# Patient Record
Sex: FEMALE | Race: WHITE | NOT HISPANIC OR LATINO | URBAN - METROPOLITAN AREA
[De-identification: names, ages, dates, MRNs, and addresses within clinical notes are randomized per-mention and may not be internally consistent; named-entity substitution may affect disease eponyms.]

---

## 2017-04-13 ENCOUNTER — EMERGENCY (EMERGENCY)
Facility: HOSPITAL | Age: 27
LOS: 1 days | Discharge: HOME | End: 2017-04-13
Attending: EMERGENCY MEDICINE | Admitting: EMERGENCY MEDICINE
Payer: COMMERCIAL

## 2017-04-13 VITALS
HEART RATE: 80 BPM | HEIGHT: 68 IN | OXYGEN SATURATION: 99 % | RESPIRATION RATE: 18 BRPM | DIASTOLIC BLOOD PRESSURE: 74 MMHG | SYSTOLIC BLOOD PRESSURE: 112 MMHG | WEIGHT: 128.09 LBS | TEMPERATURE: 98 F

## 2017-04-13 DIAGNOSIS — Z88.0 ALLERGY STATUS TO PENICILLIN: ICD-10-CM

## 2017-04-13 DIAGNOSIS — R10.9 UNSPECIFIED ABDOMINAL PAIN: ICD-10-CM

## 2017-04-13 DIAGNOSIS — Z88.1 ALLERGY STATUS TO OTHER ANTIBIOTIC AGENTS STATUS: ICD-10-CM

## 2017-04-13 DIAGNOSIS — N20.0 CALCULUS OF KIDNEY: ICD-10-CM

## 2017-04-13 LAB
ALBUMIN SERPL ELPH-MCNC: 4.4 G/DL — SIGNIFICANT CHANGE UP (ref 3.4–5)
ALP SERPL-CCNC: 91 U/L — SIGNIFICANT CHANGE UP (ref 40–120)
ALT FLD-CCNC: 31 U/L — SIGNIFICANT CHANGE UP (ref 12–42)
ANION GAP SERPL CALC-SCNC: 11 MMOL/L — SIGNIFICANT CHANGE UP (ref 9–16)
APPEARANCE UR: CLEAR — SIGNIFICANT CHANGE UP
AST SERPL-CCNC: 34 U/L — SIGNIFICANT CHANGE UP (ref 15–37)
BASOPHILS NFR BLD AUTO: 0.6 % — SIGNIFICANT CHANGE UP (ref 0–2)
BILIRUB SERPL-MCNC: 0.4 MG/DL — SIGNIFICANT CHANGE UP (ref 0.2–1.2)
BILIRUB UR-MCNC: NEGATIVE — SIGNIFICANT CHANGE UP
BUN SERPL-MCNC: 22 MG/DL — SIGNIFICANT CHANGE UP (ref 7–23)
CALCIUM SERPL-MCNC: 10 MG/DL — SIGNIFICANT CHANGE UP (ref 8.5–10.5)
CHLORIDE SERPL-SCNC: 100 MMOL/L — SIGNIFICANT CHANGE UP (ref 96–108)
CO2 SERPL-SCNC: 28 MMOL/L — SIGNIFICANT CHANGE UP (ref 22–31)
COLOR SPEC: YELLOW — SIGNIFICANT CHANGE UP
CREAT SERPL-MCNC: 0.84 MG/DL — SIGNIFICANT CHANGE UP (ref 0.5–1.3)
DIFF PNL FLD: (no result)
EOSINOPHIL NFR BLD AUTO: 3.2 % — SIGNIFICANT CHANGE UP (ref 0–6)
GLUCOSE SERPL-MCNC: 117 MG/DL — HIGH (ref 70–99)
GLUCOSE UR QL: NEGATIVE — SIGNIFICANT CHANGE UP
HCG UR QL: NEGATIVE — SIGNIFICANT CHANGE UP
HCT VFR BLD CALC: 40.5 % — SIGNIFICANT CHANGE UP (ref 34.5–45)
HGB BLD-MCNC: 14 G/DL — SIGNIFICANT CHANGE UP (ref 11.5–15.5)
IMM GRANULOCYTES NFR BLD AUTO: 0.6 % — SIGNIFICANT CHANGE UP (ref 0–1.5)
KETONES UR-MCNC: 15 MG/DL
LEUKOCYTE ESTERASE UR-ACNC: NEGATIVE — SIGNIFICANT CHANGE UP
LYMPHOCYTES # BLD AUTO: 19.9 % — SIGNIFICANT CHANGE UP (ref 13–44)
MCHC RBC-ENTMCNC: 31.3 PG — SIGNIFICANT CHANGE UP (ref 27–34)
MCHC RBC-ENTMCNC: 34.6 G/DL — SIGNIFICANT CHANGE UP (ref 32–36)
MCV RBC AUTO: 90.6 FL — SIGNIFICANT CHANGE UP (ref 80–100)
MONOCYTES NFR BLD AUTO: 8.8 % — SIGNIFICANT CHANGE UP (ref 2–14)
NEUTROPHILS NFR BLD AUTO: 66.9 % — SIGNIFICANT CHANGE UP (ref 43–77)
NITRITE UR-MCNC: NEGATIVE — SIGNIFICANT CHANGE UP
PH UR: 5.5 — SIGNIFICANT CHANGE UP (ref 4–8.1)
PLATELET # BLD AUTO: 258 K/UL — SIGNIFICANT CHANGE UP (ref 150–400)
POTASSIUM SERPL-MCNC: 3.8 MMOL/L — SIGNIFICANT CHANGE UP (ref 3.5–5.3)
POTASSIUM SERPL-SCNC: 3.8 MMOL/L — SIGNIFICANT CHANGE UP (ref 3.5–5.3)
PROT SERPL-MCNC: 8.9 G/DL — HIGH (ref 6.4–8.2)
PROT UR-MCNC: (no result) MG/DL
RBC # BLD: 4.47 M/UL — SIGNIFICANT CHANGE UP (ref 3.8–5.2)
RBC # FLD: 12.8 % — SIGNIFICANT CHANGE UP (ref 10.3–16.9)
SODIUM SERPL-SCNC: 139 MMOL/L — SIGNIFICANT CHANGE UP (ref 132–145)
SP GR SPEC: 1.02 — SIGNIFICANT CHANGE UP (ref 1–1.03)
UROBILINOGEN FLD QL: 0.2 E.U./DL — SIGNIFICANT CHANGE UP
WBC # BLD: 8.9 K/UL — SIGNIFICANT CHANGE UP (ref 3.8–10.5)
WBC # FLD AUTO: 8.9 K/UL — SIGNIFICANT CHANGE UP (ref 3.8–10.5)

## 2017-04-13 PROCEDURE — 99285 EMERGENCY DEPT VISIT HI MDM: CPT

## 2017-04-13 PROCEDURE — 74176 CT ABD & PELVIS W/O CONTRAST: CPT | Mod: 26

## 2017-04-13 RX ORDER — SODIUM CHLORIDE 9 MG/ML
1000 INJECTION INTRAMUSCULAR; INTRAVENOUS; SUBCUTANEOUS ONCE
Qty: 0 | Refills: 0 | Status: COMPLETED | OUTPATIENT
Start: 2017-04-13 | End: 2017-04-13

## 2017-04-13 RX ADMIN — SODIUM CHLORIDE 1000 MILLILITER(S): 9 INJECTION INTRAMUSCULAR; INTRAVENOUS; SUBCUTANEOUS at 22:31

## 2017-04-13 NOTE — ED PROVIDER NOTE - ATTENDING CONTRIBUTION TO CARE
Pt is a 25yo F with a h/o PCOS who p/w 2 days of intermittent R flank pain.  Severe.  Radiates to groin.  +Urinary urgency, dysuria, and hematuria.  Denies any fever, chills, CP, SOB, abd pain, N/V/D, or any other sxs.   PE - agree with PA exam as above.   CT - +3mm kidney stone on R side.   A/P - Kidney stone.  IV, IV hydration, pain control, labs UA.    -Pt improved with treatment.  Results discussed.  Instructed to f/u with urology.  The patient was instructed to return to the ER immediately or call 911 for any high fever, trouble breathing, severe vomiting, worsening pain, or any other concerns.  -Abx given due to dysuria and micro findings.

## 2017-04-13 NOTE — ED PROVIDER NOTE - MEDICAL DECISION MAKING DETAILS
CT reveals + 0.3cm kidney stone in right UVJ. Pt's pain well controlled. A&Ox3. NAD. Afebrile, nontoxic. Sitting comfortably. Will D/C pt with analgesics and F/U with Urology this week. Will cover bacteruria / WBCs with ABX. Strict return precautions reviewed with pt in which pt verbalizes understanding and agrees to.

## 2017-04-13 NOTE — ED ADULT TRIAGE NOTE - CHIEF COMPLAINT QUOTE
[facility]. Pt with c/o b/l intermittent flank pain radiating to pelvis, mild hematuria, dysuria, urinary frequency x2days. Pt had u/a and pelvic exam at [facility] with no findings, see scanned documents.

## 2017-04-14 ENCOUNTER — APPOINTMENT (OUTPATIENT)
Dept: UROLOGY | Facility: CLINIC | Age: 27
End: 2017-04-14

## 2017-04-14 VITALS
HEART RATE: 73 BPM | HEIGHT: 68 IN | TEMPERATURE: 98.7 F | DIASTOLIC BLOOD PRESSURE: 78 MMHG | WEIGHT: 124 LBS | BODY MASS INDEX: 18.79 KG/M2 | SYSTOLIC BLOOD PRESSURE: 123 MMHG

## 2017-04-14 RX ORDER — TRAMADOL HYDROCHLORIDE 50 MG/1
50 TABLET ORAL ONCE
Qty: 0 | Refills: 0 | Status: DISCONTINUED | OUTPATIENT
Start: 2017-04-14 | End: 2017-04-14

## 2017-04-14 RX ORDER — TRAMADOL HYDROCHLORIDE 50 MG/1
1 TABLET ORAL
Qty: 8 | Refills: 0
Start: 2017-04-14

## 2017-04-14 RX ORDER — AZTREONAM 2 G
1 VIAL (EA) INJECTION
Qty: 6 | Refills: 0
Start: 2017-04-14 | End: 2017-04-17

## 2017-04-14 RX ADMIN — TRAMADOL HYDROCHLORIDE 50 MILLIGRAM(S): 50 TABLET ORAL at 01:18

## 2017-04-14 RX ADMIN — Medication 1 TABLET(S): at 01:19

## 2017-04-14 RX ADMIN — TRAMADOL HYDROCHLORIDE 50 MILLIGRAM(S): 50 TABLET ORAL at 01:19

## 2017-04-14 RX ADMIN — TRAMADOL HYDROCHLORIDE 50 MILLIGRAM(S): 50 TABLET ORAL at 01:20

## 2017-04-18 ENCOUNTER — TRANSCRIPTION ENCOUNTER (OUTPATIENT)
Age: 27
End: 2017-04-18

## 2017-04-30 ENCOUNTER — FORM ENCOUNTER (OUTPATIENT)
Age: 27
End: 2017-04-30

## 2017-05-01 ENCOUNTER — OUTPATIENT (OUTPATIENT)
Dept: OUTPATIENT SERVICES | Facility: HOSPITAL | Age: 27
LOS: 1 days | End: 2017-05-01
Payer: COMMERCIAL

## 2017-05-01 PROCEDURE — 76770 US EXAM ABDO BACK WALL COMP: CPT

## 2017-05-01 PROCEDURE — 76770 US EXAM ABDO BACK WALL COMP: CPT | Mod: 26

## 2017-12-29 ENCOUNTER — APPOINTMENT (OUTPATIENT)
Dept: INTERNAL MEDICINE | Facility: CLINIC | Age: 27
End: 2017-12-29
Payer: COMMERCIAL

## 2017-12-29 VITALS
BODY MASS INDEX: 18.79 KG/M2 | HEIGHT: 68 IN | DIASTOLIC BLOOD PRESSURE: 79 MMHG | OXYGEN SATURATION: 100 % | SYSTOLIC BLOOD PRESSURE: 118 MMHG | HEART RATE: 69 BPM | WEIGHT: 124 LBS | TEMPERATURE: 97 F

## 2017-12-29 DIAGNOSIS — Z80.6 FAMILY HISTORY OF LEUKEMIA: ICD-10-CM

## 2017-12-29 PROCEDURE — 36415 COLL VENOUS BLD VENIPUNCTURE: CPT

## 2017-12-29 PROCEDURE — 99395 PREV VISIT EST AGE 18-39: CPT | Mod: 25

## 2018-01-02 LAB
ALBUMIN SERPL ELPH-MCNC: 4.7 G/DL
ALP BLD-CCNC: 71 U/L
ALT SERPL-CCNC: 17 U/L
ANION GAP SERPL CALC-SCNC: 16 MMOL/L
AST SERPL-CCNC: 37 U/L
BASOPHILS # BLD AUTO: 0.03 K/UL
BASOPHILS NFR BLD AUTO: 0.6 %
BILIRUB SERPL-MCNC: 0.5 MG/DL
BUN SERPL-MCNC: 19 MG/DL
CALCIUM SERPL-MCNC: 9.9 MG/DL
CHLORIDE SERPL-SCNC: 102 MMOL/L
CHOLEST SERPL-MCNC: 210 MG/DL
CHOLEST/HDLC SERPL: 2.2 RATIO
CO2 SERPL-SCNC: 23 MMOL/L
CREAT SERPL-MCNC: 1.03 MG/DL
EOSINOPHIL # BLD AUTO: 0.08 K/UL
EOSINOPHIL NFR BLD AUTO: 1.5 %
GLUCOSE SERPL-MCNC: 80 MG/DL
HCT VFR BLD CALC: 40.8 %
HCV AB SER QL: NONREACTIVE
HCV S/CO RATIO: 0.16 S/CO
HDLC SERPL-MCNC: 94 MG/DL
HGB BLD-MCNC: 13.6 G/DL
HIV1+2 AB SPEC QL IA.RAPID: NONREACTIVE
IMM GRANULOCYTES NFR BLD AUTO: 0 %
LDLC SERPL CALC-MCNC: 104 MG/DL
LYMPHOCYTES # BLD AUTO: 1.74 K/UL
LYMPHOCYTES NFR BLD AUTO: 33.5 %
MAN DIFF?: NORMAL
MCHC RBC-ENTMCNC: 30.6 PG
MCHC RBC-ENTMCNC: 33.3 GM/DL
MCV RBC AUTO: 91.7 FL
MONOCYTES # BLD AUTO: 0.4 K/UL
MONOCYTES NFR BLD AUTO: 7.7 %
NEUTROPHILS # BLD AUTO: 2.95 K/UL
NEUTROPHILS NFR BLD AUTO: 56.7 %
PLATELET # BLD AUTO: 294 K/UL
POTASSIUM SERPL-SCNC: 4.2 MMOL/L
PROT SERPL-MCNC: 8.4 G/DL
RBC # BLD: 4.45 M/UL
RBC # FLD: 13.1 %
SODIUM SERPL-SCNC: 141 MMOL/L
TRIGL SERPL-MCNC: 60 MG/DL
TSH SERPL-ACNC: 3.16 UIU/ML
WBC # FLD AUTO: 5.2 K/UL

## 2018-03-02 ENCOUNTER — APPOINTMENT (OUTPATIENT)
Dept: INTERNAL MEDICINE | Facility: CLINIC | Age: 28
End: 2018-03-02

## 2018-10-31 ENCOUNTER — APPOINTMENT (OUTPATIENT)
Dept: INTERNAL MEDICINE | Facility: CLINIC | Age: 28
End: 2018-10-31
Payer: COMMERCIAL

## 2018-10-31 VITALS
TEMPERATURE: 98.1 F | OXYGEN SATURATION: 99 % | HEART RATE: 67 BPM | HEIGHT: 68 IN | DIASTOLIC BLOOD PRESSURE: 75 MMHG | SYSTOLIC BLOOD PRESSURE: 108 MMHG

## 2018-10-31 PROCEDURE — 99214 OFFICE O/P EST MOD 30 MIN: CPT

## 2018-10-31 NOTE — HISTORY OF PRESENT ILLNESS
[de-identified] : 28 yr old  with history of PCOS diagnosis, here c/o difficulty sustaining sleep. She found a great therapist, AMARJIT, whom she sees every other week x 11 months and has great support. The stress of her work is intense and she finds she's awake most nights in the later part of the night.  In high school and college she was prescribed Klonipin and was keen to try this again as it worked for her. 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  Has had difficult last 2 months, death of friend's brother (suicide) and deaths in family. (maternal GM, paternal great uncle, his wife).  She is enjoying her work, but a lot of stress.  Feels a lot of anxiety.  Would like to see psychologist or psych. I'm "not at ease" I'm happy, but describes lingering unease. Has never taken medication, and has no suicidal ideation.  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  Recently diagnosed with PCOS, and metformin started. Endocrinologist at [facility]. Concerned about some weight gain since starting rx.  She's a runner, ran 2 half marathons and is planning on marathon in [place] in fall.   See gyn in January

## 2018-12-06 ENCOUNTER — RX RENEWAL (OUTPATIENT)
Age: 28
End: 2018-12-06

## 2018-12-06 RX ORDER — TRAZODONE HYDROCHLORIDE 50 MG/1
50 TABLET ORAL
Qty: 30 | Refills: 3 | Status: DISCONTINUED | COMMUNITY
Start: 2018-10-31 | End: 2018-12-06

## 2018-12-18 ENCOUNTER — RX RENEWAL (OUTPATIENT)
Age: 28
End: 2018-12-18

## 2019-01-31 ENCOUNTER — APPOINTMENT (OUTPATIENT)
Dept: INTERNAL MEDICINE | Facility: CLINIC | Age: 29
End: 2019-01-31
Payer: COMMERCIAL

## 2019-01-31 VITALS
HEART RATE: 68 BPM | BODY MASS INDEX: 17.94 KG/M2 | SYSTOLIC BLOOD PRESSURE: 105 MMHG | OXYGEN SATURATION: 100 % | HEIGHT: 68 IN | WEIGHT: 118.38 LBS | TEMPERATURE: 97.5 F | DIASTOLIC BLOOD PRESSURE: 72 MMHG

## 2019-01-31 PROCEDURE — 36415 COLL VENOUS BLD VENIPUNCTURE: CPT

## 2019-01-31 PROCEDURE — 99395 PREV VISIT EST AGE 18-39: CPT | Mod: 25

## 2019-01-31 PROCEDURE — 99214 OFFICE O/P EST MOD 30 MIN: CPT | Mod: 25

## 2019-01-31 RX ORDER — THYROID, PORCINE 30 MG/1
30 TABLET ORAL
Qty: 30 | Refills: 0 | Status: DISCONTINUED | COMMUNITY
Start: 2017-06-14 | End: 2019-01-31

## 2019-01-31 RX ORDER — MEDROXYPROGESTERONE ACETATE 10 MG/1
10 TABLET ORAL
Qty: 10 | Refills: 0 | Status: DISCONTINUED | COMMUNITY
Start: 2017-08-09 | End: 2019-01-31

## 2019-01-31 RX ORDER — SULFAMETHOXAZOLE AND TRIMETHOPRIM 800; 160 MG/1; MG/1
800-160 TABLET ORAL
Qty: 14 | Refills: 0 | Status: DISCONTINUED | COMMUNITY
Start: 2017-07-05 | End: 2019-01-31

## 2019-01-31 RX ORDER — THYROID, PORCINE 15 MG/1
15 TABLET ORAL
Qty: 30 | Refills: 0 | Status: DISCONTINUED | COMMUNITY
Start: 2017-07-03 | End: 2019-01-31

## 2019-01-31 NOTE — ASSESSMENT
[FreeTextEntry1] : Patient provided with education on recommended exercise, proper diet, recommended age appropriate screening tests, vaccines, sexual health, smoking cessation and moderate alcohol intake.  STI screening offered including HIV/ Hep C check; proper use of seat belts, helmets on bicycles, also recommended.\par \par Normal exam in 28 yr old runner, ran 5 mi/ week\par \par f/u q 6mo while on prescribing trazadone

## 2019-01-31 NOTE — HISTORY OF PRESENT ILLNESS
[de-identified] : 28 yr old  with history of amenorrhea that resolved (had been underweight; big runner; now off metformin since summer and all meds except trazadone, and feeling much better;  here for physical. Sleeping much better on trazadone\par \par LAST VISIT:  28 yr old  with history of PCOS diagnosis, here c/o difficulty sustaining sleep. She found a great therapist, AMARJIT, whom she sees every other week x 11 months and has great support. The stress of her work is intense and she finds she's awake most nights in the later part of the night.  In high school and college she was prescribed Klonipin and was keen to try this again as it worked for her. 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  Has had difficult last 2 months, death of friend's brother (suicide) and deaths in family. (maternal GM, paternal great uncle, his wife).  She is enjoying her work, but a lot of stress.  Feels a lot of anxiety.  Would like to see psychologist or psych. I'm "not at ease" I'm happy, but describes lingering unease. Has never taken medication, and has no suicidal ideation.  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  Recently diagnosed with PCOS, and metformin started. Endocrinologist at [facility]. Concerned about some weight gain since starting rx.  She's a runner, ran 2 half marathons and is planning on marathon in [place] in fall.   See gyn in January

## 2019-01-31 NOTE — HEALTH RISK ASSESSMENT
[Very Good] : ~his/her~  mood as very good [] : No [No falls in past year] : Patient reported no falls in the past year [0] : 2) Feeling down, depressed, or hopeless: Not at all (0) [de-identified] : "drink socially on weekends" -  [[id number]] : 0 [Patient reported PAP Smear was normal] : Patient reported PAP Smear was normal [HIV Test offered] : HIV Test offered [Hepatitis C test offered] : Hepatitis C test offered [PapSmearDate] : 4/2018

## 2019-02-08 LAB
25(OH)D3 SERPL-MCNC: 35 NG/ML
ALBUMIN SERPL ELPH-MCNC: 5 G/DL
ALP BLD-CCNC: 55 U/L
ALT SERPL-CCNC: 14 U/L
ANION GAP SERPL CALC-SCNC: 14 MMOL/L
AST SERPL-CCNC: 27 U/L
BASOPHILS # BLD AUTO: 0.01 K/UL
BASOPHILS NFR BLD AUTO: 0.2 %
BILIRUB SERPL-MCNC: 0.3 MG/DL
BUN SERPL-MCNC: 23 MG/DL
CALCIUM SERPL-MCNC: 10 MG/DL
CHLORIDE SERPL-SCNC: 105 MMOL/L
CHOLEST SERPL-MCNC: 170 MG/DL
CHOLEST/HDLC SERPL: 2.2 RATIO
CO2 SERPL-SCNC: 24 MMOL/L
CREAT SERPL-MCNC: 0.82 MG/DL
EOSINOPHIL # BLD AUTO: 0.03 K/UL
EOSINOPHIL NFR BLD AUTO: 0.6 %
GLUCOSE SERPL-MCNC: 73 MG/DL
HCT VFR BLD CALC: 37.8 %
HCV AB SER QL: NONREACTIVE
HCV S/CO RATIO: 0.14 S/CO
HDLC SERPL-MCNC: 79 MG/DL
HGB BLD-MCNC: 12.1 G/DL
HIV1+2 AB SPEC QL IA.RAPID: NONREACTIVE
IMM GRANULOCYTES NFR BLD AUTO: 0.2 %
LDLC SERPL CALC-MCNC: 79 MG/DL
LYMPHOCYTES # BLD AUTO: 1.42 K/UL
LYMPHOCYTES NFR BLD AUTO: 28.7 %
MAN DIFF?: NORMAL
MCHC RBC-ENTMCNC: 30.7 PG
MCHC RBC-ENTMCNC: 32 GM/DL
MCV RBC AUTO: 95.9 FL
MONOCYTES # BLD AUTO: 0.41 K/UL
MONOCYTES NFR BLD AUTO: 8.3 %
NEUTROPHILS # BLD AUTO: 3.06 K/UL
NEUTROPHILS NFR BLD AUTO: 62 %
PLATELET # BLD AUTO: 259 K/UL
POTASSIUM SERPL-SCNC: 4 MMOL/L
PROT SERPL-MCNC: 7.6 G/DL
RBC # BLD: 3.94 M/UL
RBC # FLD: 13.4 %
SODIUM SERPL-SCNC: 143 MMOL/L
TRIGL SERPL-MCNC: 58 MG/DL
TSH SERPL-ACNC: 2.93 UIU/ML
WBC # FLD AUTO: 4.94 K/UL

## 2019-03-22 ENCOUNTER — TRANSCRIPTION ENCOUNTER (OUTPATIENT)
Age: 29
End: 2019-03-22

## 2019-03-22 ENCOUNTER — OTHER (OUTPATIENT)
Age: 29
End: 2019-03-22

## 2019-06-18 ENCOUNTER — EMERGENCY (EMERGENCY)
Facility: HOSPITAL | Age: 29
LOS: 1 days | Discharge: HOME | End: 2019-06-18
Admitting: EMERGENCY MEDICINE
Payer: COMMERCIAL

## 2019-06-18 VITALS
RESPIRATION RATE: 17 BRPM | HEART RATE: 49 BPM | TEMPERATURE: 98 F | SYSTOLIC BLOOD PRESSURE: 106 MMHG | HEIGHT: 68 IN | DIASTOLIC BLOOD PRESSURE: 68 MMHG | OXYGEN SATURATION: 99 % | WEIGHT: 110.23 LBS

## 2019-06-18 PROCEDURE — 99283 EMERGENCY DEPT VISIT LOW MDM: CPT | Mod: 25

## 2019-06-18 PROCEDURE — 99284 EMERGENCY DEPT VISIT MOD MDM: CPT

## 2019-06-18 RX ORDER — LIDOCAINE 4 G/100G
10 CREAM TOPICAL ONCE
Refills: 0 | Status: COMPLETED | OUTPATIENT
Start: 2019-06-18 | End: 2019-06-18

## 2019-06-18 RX ORDER — LIDOCAINE 4 G/100G
5 CREAM TOPICAL
Qty: 100 | Refills: 0
Start: 2019-06-18 | End: 2019-06-22

## 2019-06-18 RX ORDER — ACYCLOVIR 50 MG/G
1 OINTMENT TOPICAL
Qty: 1 | Refills: 0
Start: 2019-06-18 | End: 2019-06-21

## 2019-06-18 RX ORDER — VALACYCLOVIR 500 MG/1
2000 TABLET, FILM COATED ORAL ONCE
Refills: 0 | Status: COMPLETED | OUTPATIENT
Start: 2019-06-18 | End: 2019-06-18

## 2019-06-18 RX ORDER — IBUPROFEN 200 MG
1 TABLET ORAL
Qty: 15 | Refills: 0
Start: 2019-06-18 | End: 2019-06-22

## 2019-06-18 RX ORDER — OXYCODONE AND ACETAMINOPHEN 5; 325 MG/1; MG/1
1 TABLET ORAL ONCE
Refills: 0 | Status: DISCONTINUED | OUTPATIENT
Start: 2019-06-18 | End: 2019-06-18

## 2019-06-18 RX ADMIN — OXYCODONE AND ACETAMINOPHEN 1 TABLET(S): 5; 325 TABLET ORAL at 09:09

## 2019-06-18 RX ADMIN — LIDOCAINE 10 MILLILITER(S): 4 CREAM TOPICAL at 08:43

## 2019-06-18 RX ADMIN — VALACYCLOVIR 2000 MILLIGRAM(S): 500 TABLET, FILM COATED ORAL at 09:09

## 2019-06-18 NOTE — ED PROVIDER NOTE - CLINICAL SUMMARY MEDICAL DECISION MAKING FREE TEXT BOX
30 yo F with pmh of hypothyroidism and insomnia c/o lip pain since last night. Pt gets cold sore frequently so last night when she felt some pain she used her topical zovarex cream before bed. Pt woke up with lip swelling and sores. +vesicular rash over upper and lower lips diffuse throughout lips with swelling of lips. No tongue swelling. Given pain control and dose of valtrex.

## 2019-06-18 NOTE — ED PROVIDER NOTE - OBJECTIVE STATEMENT
28 yo F with pmh of hypothyroidism and insomnia c/o lip pain since last night. Pt gets cold sore frequently so last night when she felt some pain she used her topical zovarex cream before bed. Pt woke up with lip swelling and sores. Pain is much worse with more sores than usual. Denies fever, chills, lip swelling, sob. Pt states her pain is severe and she can barely open her mouth , brush her teeth or eat.

## 2019-06-18 NOTE — ED PROVIDER NOTE - NSFOLLOWUPINSTRUCTIONS_ED_ALL_ED_FT
Cold Sore  Image   A cold sore, also called a fever blister, is a skin infection that is caused by a virus. This infection causes small, fluid-filled sores to form inside of the mouth or on the lips, gums, nose, chin, or cheeks. Cold sores can spread to other parts of the body, such as the eyes or fingers.    Cold sores can be spread or passed from person to person (contagious) until the sores crust over completely. Cold sores can be spread through close contact, such as kissing or sharing a drinking glass.    Follow these instructions at home:  Medicines     Take or apply over-the-counter and prescription medicines only as told by your doctor.  Use a cotton-tip swab to apply creams or gels to your sores.  Sore Care     Do not touch the sores or pick the scabs.  Wash your hands often. Do not touch your eyes without washing your hands first.  Keep the sores clean and dry.  If directed, apply ice to the sores:  Put ice in a plastic bag.  Place a towel between your skin and the bag.  Leave the ice on for 20 minutes, 2–3 times per day.  Lifestyle     Do not kiss, have oral sex, or share personal items until your sores heal.  Eat a soft, bland diet. Avoid eating hot, cold, or salty foods. These can hurt your mouth.  Use a straw if it hurts to drink out of a glass.  Avoid the sun and limit your stress if these things trigger outbreaks. If sun causes cold sores, apply sunscreen on your lips before being out in the sun.  Contact a doctor if:  You have symptoms for more than two weeks.  You have pus coming from the sores.  You have redness that is spreading.  You have pain or irritation in your eye.  You get sores on your genitals.  Your sores do not heal within two weeks.  You get cold sores often.  Get help right away if:  You have a fever and your symptoms suddenly get worse.  You have a headache and confusion.

## 2019-06-22 DIAGNOSIS — B00.1 HERPESVIRAL VESICULAR DERMATITIS: ICD-10-CM

## 2019-06-22 DIAGNOSIS — K13.0 DISEASES OF LIPS: ICD-10-CM

## 2019-08-23 ENCOUNTER — APPOINTMENT (OUTPATIENT)
Dept: VASCULAR SURGERY | Facility: CLINIC | Age: 29
End: 2019-08-23
Payer: COMMERCIAL

## 2019-08-23 VITALS — SYSTOLIC BLOOD PRESSURE: 101 MMHG | OXYGEN SATURATION: 99 % | HEART RATE: 66 BPM | DIASTOLIC BLOOD PRESSURE: 66 MMHG

## 2019-08-23 PROCEDURE — 99243 OFF/OP CNSLTJ NEW/EST LOW 30: CPT

## 2019-08-23 PROCEDURE — 93970 EXTREMITY STUDY: CPT

## 2019-08-23 NOTE — HISTORY OF PRESENT ILLNESS
[FreeTextEntry1] : pt has varicose veins in the legs bilat \par she says that the veins are hurting\par they are getting worse\par \par they burn and itch\par

## 2019-08-23 NOTE — PHYSICAL EXAM
[JVD] : no jugular venous distention  [2+] : left 2+ [Ankle Swelling (On Exam)] : not present [Varicose Veins Of Lower Extremities] : bilaterally [Ankle Swelling On The Left] : moderate [] : not present [No Rash or Lesion] : No rash or lesion [Calm] : calm [de-identified] : pleasant

## 2019-10-04 ENCOUNTER — APPOINTMENT (OUTPATIENT)
Dept: VASCULAR SURGERY | Facility: CLINIC | Age: 29
End: 2019-10-04
Payer: COMMERCIAL

## 2019-10-04 DIAGNOSIS — I83.10 VARICOSE VEINS OF UNSPECIFIED LOWER EXTREMITY WITH INFLAMMATION: ICD-10-CM

## 2019-10-04 PROCEDURE — 99213 OFFICE O/P EST LOW 20 MIN: CPT

## 2019-10-04 NOTE — HISTORY OF PRESENT ILLNESS
[FreeTextEntry1] : 29 year old F presents for f/u due to lower extremity varicose veins. Patient has been having a long standing history of varicosities but over the years her varicose veins became large, more pronounced, tender to touch.She states that her varicose veins burn, itch and interfere with daily activities. Patient has been wearing compression stockings for about a year with minimal relief. Patient states that it became difficult for her to stay for prolonged period of time (> 1/2 hr). Patient occasionally takes over the counter pain medications to alleviate her  symptoms.\par Patient denies hx of DVT/SVT, bleeding from her  varicosities, claudication.\par

## 2019-10-04 NOTE — PHYSICAL EXAM
[Normal Breath Sounds] : Normal breath sounds [Normal Heart Sounds] : normal heart sounds [2+] : left 2+ [Varicose Veins Of Lower Extremities] : bilaterally [Ankle Swelling On The Left] : moderate [No Rash or Lesion] : No rash or lesion [Alert] : alert [Calm] : calm [JVD] : no jugular venous distention  [Ankle Swelling (On Exam)] : not present [] : not present [Abdomen Tenderness] : ~T ~M No abdominal tenderness [de-identified] : WN/WD, NAD [de-identified] : NC/AT [de-identified] : + FROM all 4 extr. [de-identified] : b/l bulging varicose veins

## 2019-10-04 NOTE — REVIEW OF SYSTEMS
[As Noted in HPI] : as noted in HPI [Limb Pain] : limb pain [Negative] : Heme/Lymph [Limb Swelling] : no limb swelling

## 2019-10-04 NOTE — ASSESSMENT
[Arterial/Venous Disease] : arterial/venous disease [FreeTextEntry1] : 29 year old F presents for f/u due to lower extremity varicose veins.\par Patient with RLE GSV reflux that is short and b/l gross varicosities that burn, itch and interfere with patient's exercising.\par Patient is a runner and she states that towards the end of a day she is in "excruciating pain" over her veins and she really would like to take them out.\par She tried 20-30 mmHg compression stockings with temporarily relief, she tried OTC medications, but she would like to have the definitive solution.\par We recommend Stab phlebectomy, one leg at a time.\par She agreed and would like to proceed.

## 2019-10-08 PROBLEM — I83.10 VARICOSE VEINS OF LOWER EXTREMITY WITH INFLAMMATION, UNSPECIFIED LATERALITY: Status: ACTIVE | Noted: 2019-10-08

## 2020-02-12 ENCOUNTER — RX RENEWAL (OUTPATIENT)
Age: 30
End: 2020-02-12

## 2020-10-12 ENCOUNTER — RX RENEWAL (OUTPATIENT)
Age: 30
End: 2020-10-12

## 2020-10-18 PROBLEM — Z11.3 ROUTINE SCREENING FOR STI (SEXUALLY TRANSMITTED INFECTION): Status: ACTIVE | Noted: 2020-10-18

## 2020-10-19 ENCOUNTER — APPOINTMENT (OUTPATIENT)
Dept: INTERNAL MEDICINE | Facility: CLINIC | Age: 30
End: 2020-10-19
Payer: COMMERCIAL

## 2020-10-19 VITALS
WEIGHT: 112 LBS | TEMPERATURE: 97.8 F | BODY MASS INDEX: 16.97 KG/M2 | SYSTOLIC BLOOD PRESSURE: 118 MMHG | HEIGHT: 68 IN | DIASTOLIC BLOOD PRESSURE: 73 MMHG | HEART RATE: 64 BPM | OXYGEN SATURATION: 98 %

## 2020-10-19 DIAGNOSIS — Z11.3 ENCOUNTER FOR SCREENING FOR INFECTIONS WITH A PREDOMINANTLY SEXUAL MODE OF TRANSMISSION: ICD-10-CM

## 2020-10-19 DIAGNOSIS — E55.9 VITAMIN D DEFICIENCY, UNSPECIFIED: ICD-10-CM

## 2020-10-19 DIAGNOSIS — Z12.11 ENCOUNTER FOR SCREENING FOR MALIGNANT NEOPLASM OF COLON: ICD-10-CM

## 2020-10-19 PROCEDURE — G0442 ANNUAL ALCOHOL SCREEN 15 MIN: CPT | Mod: NC

## 2020-10-19 PROCEDURE — 36415 COLL VENOUS BLD VENIPUNCTURE: CPT

## 2020-10-19 PROCEDURE — 99072 ADDL SUPL MATRL&STAF TM PHE: CPT

## 2020-10-19 PROCEDURE — 99395 PREV VISIT EST AGE 18-39: CPT | Mod: 25

## 2020-10-19 RX ORDER — LEVOTHYROXINE SODIUM 50 UG/1
50 TABLET ORAL
Qty: 36 | Refills: 0 | Status: ACTIVE | COMMUNITY
Start: 2020-02-06

## 2020-10-19 RX ORDER — LINACLOTIDE 72 UG/1
72 CAPSULE, GELATIN COATED ORAL
Qty: 30 | Refills: 0 | Status: ACTIVE | COMMUNITY
Start: 2020-08-28

## 2020-10-20 LAB
25(OH)D3 SERPL-MCNC: 37.5 NG/ML
ALBUMIN SERPL ELPH-MCNC: 4.8 G/DL
ALP BLD-CCNC: 80 U/L
ALT SERPL-CCNC: 17 U/L
ANION GAP SERPL CALC-SCNC: 17 MMOL/L
AST SERPL-CCNC: 31 U/L
BASOPHILS # BLD AUTO: 0.05 K/UL
BASOPHILS NFR BLD AUTO: 0.6 %
BILIRUB SERPL-MCNC: 0.4 MG/DL
BUN SERPL-MCNC: 17 MG/DL
CALCIUM SERPL-MCNC: 9.4 MG/DL
CHLORIDE SERPL-SCNC: 102 MMOL/L
CHOLEST SERPL-MCNC: 182 MG/DL
CHOLEST/HDLC SERPL: 1.9 RATIO
CO2 SERPL-SCNC: 19 MMOL/L
CREAT SERPL-MCNC: 0.73 MG/DL
EOSINOPHIL # BLD AUTO: 0.04 K/UL
EOSINOPHIL NFR BLD AUTO: 0.5 %
GLUCOSE SERPL-MCNC: 80 MG/DL
HCT VFR BLD CALC: 37.8 %
HCV AB SER QL: NONREACTIVE
HCV S/CO RATIO: 0.15 S/CO
HDLC SERPL-MCNC: 94 MG/DL
HGB BLD-MCNC: 12.1 G/DL
HIV1+2 AB SPEC QL IA.RAPID: NONREACTIVE
IMM GRANULOCYTES NFR BLD AUTO: 0.2 %
LDLC SERPL CALC-MCNC: 74 MG/DL
LYMPHOCYTES # BLD AUTO: 2.29 K/UL
LYMPHOCYTES NFR BLD AUTO: 28.1 %
MAN DIFF?: NORMAL
MCHC RBC-ENTMCNC: 31.3 PG
MCHC RBC-ENTMCNC: 32 GM/DL
MCV RBC AUTO: 97.9 FL
MONOCYTES # BLD AUTO: 0.48 K/UL
MONOCYTES NFR BLD AUTO: 5.9 %
NEUTROPHILS # BLD AUTO: 5.27 K/UL
NEUTROPHILS NFR BLD AUTO: 64.7 %
PLATELET # BLD AUTO: 266 K/UL
POTASSIUM SERPL-SCNC: 3.9 MMOL/L
PROT SERPL-MCNC: 7.4 G/DL
RBC # BLD: 3.86 M/UL
RBC # FLD: 12.6 %
SODIUM SERPL-SCNC: 139 MMOL/L
TRIGL SERPL-MCNC: 74 MG/DL
TSH SERPL-ACNC: 1.1 UIU/ML
WBC # FLD AUTO: 8.15 K/UL

## 2020-11-20 NOTE — ED PROVIDER NOTE - PHYSICAL EXAMINATION
Addended by: PATRICK PARIS on: 11/20/2020 01:44 PM     Modules accepted: Orders    
CONSTITUTIONAL: Well-appearing; well-nourished; in no apparent distress.   HEAD: Normocephalic; atraumatic.   EYES: PERRL; EOM intact; conjunctiva and sclera clear  ENT: +vesicular rash over upper and lower lips diffuse throughout lips with swelling of lips. No tongue swelling.   NECK: Supple; non-tender;   CARDIOVASCULAR: Normal S1, S2; no murmurs, rubs, or gallops. Regular rate and rhythm.   RESPIRATORY: Breathing easily; breath sounds clear and equal bilaterally; no wheezes, rhonchi, or rales.  MSK: FROM at all extremities, normal tone   EXT: No cyanosis or edema; N/V intact  SKIN: Normal for age and race; warm; dry; good turgor; no apparent lesions or rash.

## 2021-02-15 ENCOUNTER — EMERGENCY (EMERGENCY)
Facility: HOSPITAL | Age: 31
LOS: 1 days | Discharge: HOME | End: 2021-02-15
Attending: EMERGENCY MEDICINE | Admitting: EMERGENCY MEDICINE
Payer: COMMERCIAL

## 2021-02-15 VITALS
OXYGEN SATURATION: 100 % | RESPIRATION RATE: 18 BRPM | TEMPERATURE: 98 F | HEART RATE: 52 BPM | HEIGHT: 68 IN | WEIGHT: 117.95 LBS | SYSTOLIC BLOOD PRESSURE: 133 MMHG | DIASTOLIC BLOOD PRESSURE: 75 MMHG

## 2021-02-15 VITALS
TEMPERATURE: 98 F | DIASTOLIC BLOOD PRESSURE: 67 MMHG | OXYGEN SATURATION: 99 % | SYSTOLIC BLOOD PRESSURE: 124 MMHG | RESPIRATION RATE: 16 BRPM | HEART RATE: 55 BPM

## 2021-02-15 DIAGNOSIS — N20.0 CALCULUS OF KIDNEY: ICD-10-CM

## 2021-02-15 DIAGNOSIS — R11.2 NAUSEA WITH VOMITING, UNSPECIFIED: ICD-10-CM

## 2021-02-15 DIAGNOSIS — Z20.822 CONTACT WITH AND (SUSPECTED) EXPOSURE TO COVID-19: ICD-10-CM

## 2021-02-15 PROCEDURE — 74176 CT ABD & PELVIS W/O CONTRAST: CPT | Mod: 26,MG

## 2021-02-15 PROCEDURE — G1004: CPT

## 2021-02-15 PROCEDURE — 99285 EMERGENCY DEPT VISIT HI MDM: CPT

## 2021-02-15 RX ORDER — ACETAMINOPHEN 500 MG
650 TABLET ORAL ONCE
Refills: 0 | Status: COMPLETED | OUTPATIENT
Start: 2021-02-15 | End: 2021-02-15

## 2021-02-15 RX ORDER — MORPHINE SULFATE 50 MG/1
4 CAPSULE, EXTENDED RELEASE ORAL ONCE
Refills: 0 | Status: DISCONTINUED | OUTPATIENT
Start: 2021-02-15 | End: 2021-02-15

## 2021-02-15 RX ORDER — OXYCODONE HYDROCHLORIDE 5 MG/1
1 TABLET ORAL
Qty: 16 | Refills: 0
Start: 2021-02-15 | End: 2021-02-18

## 2021-02-15 RX ORDER — KETOROLAC TROMETHAMINE 30 MG/ML
15 SYRINGE (ML) INJECTION ONCE
Refills: 0 | Status: DISCONTINUED | OUTPATIENT
Start: 2021-02-15 | End: 2021-02-15

## 2021-02-15 RX ORDER — SODIUM CHLORIDE 9 MG/ML
1000 INJECTION INTRAMUSCULAR; INTRAVENOUS; SUBCUTANEOUS ONCE
Refills: 0 | Status: COMPLETED | OUTPATIENT
Start: 2021-02-15 | End: 2021-02-15

## 2021-02-15 RX ADMIN — Medication 15 MILLIGRAM(S): at 08:56

## 2021-02-15 RX ADMIN — MORPHINE SULFATE 4 MILLIGRAM(S): 50 CAPSULE, EXTENDED RELEASE ORAL at 10:02

## 2021-02-15 RX ADMIN — Medication 650 MILLIGRAM(S): at 08:57

## 2021-02-15 RX ADMIN — SODIUM CHLORIDE 1000 MILLILITER(S): 9 INJECTION INTRAMUSCULAR; INTRAVENOUS; SUBCUTANEOUS at 08:56

## 2021-02-15 NOTE — ED PROVIDER NOTE - NSFOLLOWUPINSTRUCTIONS_ED_ALL_ED_FT
Can take tylenol 650mg or motrin 600mg (May cause stomach irritation - take with food and avoid prolonged use) every 6hrs as needed for pain.  Can take oxycodone as prescribed for more severe pain - may cause constipation! Stay well hydrated and can take over the counter stool softener like senna or colace.   Stay well hydrated.  Return for fevers, persistent vomit, uncontrolled pain, worsening breathing, worsening lightheaded, unable to urinate.  Follow up with primary doctor within 1-2 days.   Follow up with urologist within 1 week.   Can call [phone] to schedule appointment.     Kidney Stones    Kidney stones (urolithiasis) are crystal deposits that form inside your kidneys. Pain is caused by the stone moving through the urinary tract, causing spasms of the ureter. Drink enough water and fluids to keep your urine clear or pale yellow. This will help you to pass the stone or stone fragments. If provided a strainer, strain all urine and keep all particulate matter and stones for a follow up appointment with a urologist.    SEEK IMMEDIATE MEDICAL CARE IF YOU HAVE ANY OF THE FOLLOWING SYMPTOMS: pain not controlled with medication, fever/chills, worsening vomiting, inability to urinate, or dizziness/lightheadedness. Can take tylenol 650mg or motrin 600mg (May cause stomach irritation - take with food and avoid prolonged use) every 6hrs as needed for pain.  Can take oxycodone as prescribed for more severe pain - may cause constipation! Stay well hydrated and can take over the counter stool softener like senna or colace.   Stay well hydrated.  Return for fevers, persistent vomit, uncontrolled pain, worsening breathing, worsening lightheaded, unable to urinate.  Follow up with primary doctor within 1-2 days.   Follow up with urologist within 1 week.   Can call [phone] to schedule appointment.     Your COVID results are pending, can call ER at [phone] to get results. Can take a few hours to a few days to result. If you are "positive," you will get a phone call.     Kidney Stones    Kidney stones (urolithiasis) are crystal deposits that form inside your kidneys. Pain is caused by the stone moving through the urinary tract, causing spasms of the ureter. Drink enough water and fluids to keep your urine clear or pale yellow. This will help you to pass the stone or stone fragments. If provided a strainer, strain all urine and keep all particulate matter and stones for a follow up appointment with a urologist.    SEEK IMMEDIATE MEDICAL CARE IF YOU HAVE ANY OF THE FOLLOWING SYMPTOMS: pain not controlled with medication, fever/chills, worsening vomiting, inability to urinate, or dizziness/lightheadedness.

## 2021-02-15 NOTE — ED PROVIDER NOTE - PATIENT PORTAL LINK FT
You can access the FollowMyHealth Patient Portal offered by [facility] by registering at the following website: [email or web address]. By joining [employer]’s FollowMyHealth portal, you will also be able to view your health information using other applications (apps) compatible with our system.

## 2021-02-15 NOTE — ED PROVIDER NOTE - CPE EDP SKIN NORM
Nursing Note by Bridget Gary MA at 01/19/18 10:09 AM     Author:  Bridget Gary MA Service:  (none) Author Type:  Medical Assistant     Filed:  01/19/18 10:09 AM Encounter Date:  1/19/2018 Status:  Signed     :  Bridget Gary MA (Medical Assistant)            If provider orders tests at today's visit, patient would like to be contacted via[AG1.1T] my chart[AG1.1M] ([employer] or by telephone).  If to contact patient by phone, patient's preferred phone # is [phone] (cell) and it is[AG1.1T] ok[AG1.1M] to leave message on voice mail or with family member.  If medications are ordered at today's visit, the pharmacy name/location patient would like them to be sent to is      [facility] [address] [place] [address] & [place]  .[AG1.1T]         Revision History        User Key Date/Time User Provider Type Action    > AG1.1 01/19/18 10:09 AM Bridget Gary MA Medical Assistant Sign    M - Manual, T - Template             normal...

## 2021-02-15 NOTE — ED PROVIDER NOTE - OBJECTIVE STATEMENT
30F PMH hypothyroid, nephrolithiasis (2017, conservative management) p/w R flank pain, since ~1400 yesterday, sudden onset, fluctuating but constant, feels similar to prior stone. Period began yesterday, also has her usual "menstrual cramps," last took aleve last night w/ improvement in her cramps but not the flank pain. NBNB NV x1, no further nausea. Also diarrhea x5.   Denies HA, SOB/CP, f/c, URI symptoms, dysuria/hematuria, focal weakness/numbness, rashes, recent travel/antibiotics, black/bloody stool.   meds: synthroid, trazadone qhs for sleep

## 2021-02-15 NOTE — ED PROVIDER NOTE - PHYSICAL EXAMINATION
no LE edema, normal equal distal pulses, steady unassisted gait. no CVAT  No spinal ttp, neck FROM. Strength 5/5. No bony ttp, FROM all extremities.

## 2021-02-15 NOTE — ED PROVIDER NOTE - CLINICAL SUMMARY MEDICAL DECISION MAKING FREE TEXT BOX
30F PMH hypothyroid, nephrolithiasis (2017, conservative management) p/w R flank pain, since ~1400 yesterday, sudden onset, fluctuating but constant, feels similar to prior stone. Period began yesterday, also has her usual "menstrual cramps," last took aleve last night w/ improvement in her cramps but not the flank pain. NBNB NV x1, no further nausea. Also diarrhea x5. No other systemic symptoms. Bradycardia, other vitals wnl, exam as above. Very well appearing.  ddx: Likely renal colic vs. MSK vs. less likely pyelo.  Labs, CT, UA, IVF/symptom control, reassess.

## 2021-02-15 NOTE — ED PROVIDER NOTE - PROGRESS NOTE DETAILS
Klepfish: WBC 11, other labs grossly wnl. UA no infection. CT showing "Mild right hydronephrosis and perinephric/periureteral fat stranding, with 2 proximal ureteral stones measuring 2 mm and 3 mm respectively" Pt received morphine ~10min ago, pain now improved. Will reassess. Klepfish: pt remains feeling much better. tolerating po. Clinically no indication for further emergent ED workup or hospitalization at this time. Comfortable for dc, outpt f/u.

## 2021-02-15 NOTE — ED ADULT TRIAGE NOTE - CHIEF COMPLAINT QUOTE
Pt states " I think I have a kidney stone." + R sided flank pain since 2:30 am. + Nausea and diarrhea. currently on menstrual cycle. denies fevers, chills.

## 2021-02-15 NOTE — ED PROVIDER NOTE - CARE PROVIDER_API CALL
Arianna Polanco)  Urology  [address]  [place], [place] [address]  Phone: [phone]  Fax: [phone]  Follow Up Time:

## 2021-02-16 ENCOUNTER — APPOINTMENT (OUTPATIENT)
Dept: INTERNAL MEDICINE | Facility: CLINIC | Age: 31
End: 2021-02-16

## 2021-02-16 ENCOUNTER — TRANSCRIPTION ENCOUNTER (OUTPATIENT)
Age: 31
End: 2021-02-16

## 2021-02-16 ENCOUNTER — INPATIENT (INPATIENT)
Facility: HOSPITAL | Age: 31
LOS: 1 days | Discharge: HOME | DRG: 660 | End: 2021-02-18
Attending: UROLOGY | Admitting: UROLOGY
Payer: COMMERCIAL

## 2021-02-16 VITALS
DIASTOLIC BLOOD PRESSURE: 85 MMHG | RESPIRATION RATE: 16 BRPM | SYSTOLIC BLOOD PRESSURE: 126 MMHG | HEART RATE: 57 BPM | OXYGEN SATURATION: 97 % | WEIGHT: 117.95 LBS | HEIGHT: 68 IN | TEMPERATURE: 99 F

## 2021-02-16 DIAGNOSIS — N13.2 HYDRONEPHROSIS WITH RENAL AND URETERAL CALCULOUS OBSTRUCTION: ICD-10-CM

## 2021-02-16 DIAGNOSIS — E03.9 HYPOTHYROIDISM, UNSPECIFIED: ICD-10-CM

## 2021-02-16 LAB
ALBUMIN SERPL ELPH-MCNC: 4.1 G/DL — SIGNIFICANT CHANGE UP (ref 3.3–5)
ALP SERPL-CCNC: 69 U/L — SIGNIFICANT CHANGE UP (ref 40–120)
ALT FLD-CCNC: 12 U/L — SIGNIFICANT CHANGE UP (ref 10–45)
ANION GAP SERPL CALC-SCNC: 9 MMOL/L — SIGNIFICANT CHANGE UP (ref 5–17)
APPEARANCE UR: CLEAR — SIGNIFICANT CHANGE UP
APTT BLD: 25.3 SEC — LOW (ref 27.5–35.5)
AST SERPL-CCNC: 26 U/L — SIGNIFICANT CHANGE UP (ref 10–40)
BASOPHILS # BLD AUTO: 0.02 K/UL — SIGNIFICANT CHANGE UP (ref 0–0.2)
BASOPHILS NFR BLD AUTO: 0.2 % — SIGNIFICANT CHANGE UP (ref 0–2)
BILIRUB SERPL-MCNC: 0.4 MG/DL — SIGNIFICANT CHANGE UP (ref 0.2–1.2)
BILIRUB UR-MCNC: NEGATIVE — SIGNIFICANT CHANGE UP
BLD GP AB SCN SERPL QL: NEGATIVE — SIGNIFICANT CHANGE UP
BUN SERPL-MCNC: 17 MG/DL — SIGNIFICANT CHANGE UP (ref 7–23)
CALCIUM SERPL-MCNC: 8.9 MG/DL — SIGNIFICANT CHANGE UP (ref 8.4–10.5)
CHLORIDE SERPL-SCNC: 101 MMOL/L — SIGNIFICANT CHANGE UP (ref 96–108)
CO2 SERPL-SCNC: 25 MMOL/L — SIGNIFICANT CHANGE UP (ref 22–31)
COLOR SPEC: YELLOW — SIGNIFICANT CHANGE UP
CREAT SERPL-MCNC: 1.11 MG/DL — SIGNIFICANT CHANGE UP (ref 0.5–1.3)
DIFF PNL FLD: NEGATIVE — SIGNIFICANT CHANGE UP
EOSINOPHIL # BLD AUTO: 0.04 K/UL — SIGNIFICANT CHANGE UP (ref 0–0.5)
EOSINOPHIL NFR BLD AUTO: 0.3 % — SIGNIFICANT CHANGE UP (ref 0–6)
GLUCOSE SERPL-MCNC: 93 MG/DL — SIGNIFICANT CHANGE UP (ref 70–99)
GLUCOSE UR QL: NEGATIVE — SIGNIFICANT CHANGE UP
HCT VFR BLD CALC: 35.6 % — SIGNIFICANT CHANGE UP (ref 34.5–45)
HGB BLD-MCNC: 11.5 G/DL — SIGNIFICANT CHANGE UP (ref 11.5–15.5)
IMM GRANULOCYTES NFR BLD AUTO: 0.4 % — SIGNIFICANT CHANGE UP (ref 0–1.5)
INR BLD: 0.99 — SIGNIFICANT CHANGE UP (ref 0.88–1.16)
KETONES UR-MCNC: NEGATIVE — SIGNIFICANT CHANGE UP
LEUKOCYTE ESTERASE UR-ACNC: NEGATIVE — SIGNIFICANT CHANGE UP
LYMPHOCYTES # BLD AUTO: 1.11 K/UL — SIGNIFICANT CHANGE UP (ref 1–3.3)
LYMPHOCYTES # BLD AUTO: 9.2 % — LOW (ref 13–44)
MCHC RBC-ENTMCNC: 30.3 PG — SIGNIFICANT CHANGE UP (ref 27–34)
MCHC RBC-ENTMCNC: 32.3 GM/DL — SIGNIFICANT CHANGE UP (ref 32–36)
MCV RBC AUTO: 93.7 FL — SIGNIFICANT CHANGE UP (ref 80–100)
MONOCYTES # BLD AUTO: 1.12 K/UL — HIGH (ref 0–0.9)
MONOCYTES NFR BLD AUTO: 9.3 % — SIGNIFICANT CHANGE UP (ref 2–14)
NEUTROPHILS # BLD AUTO: 9.67 K/UL — HIGH (ref 1.8–7.4)
NEUTROPHILS NFR BLD AUTO: 80.6 % — HIGH (ref 43–77)
NITRITE UR-MCNC: NEGATIVE — SIGNIFICANT CHANGE UP
NRBC # BLD: 0 /100 WBCS — SIGNIFICANT CHANGE UP (ref 0–0)
PH UR: 5.5 — SIGNIFICANT CHANGE UP (ref 5–8)
PLATELET # BLD AUTO: 229 K/UL — SIGNIFICANT CHANGE UP (ref 150–400)
POTASSIUM SERPL-MCNC: 3.7 MMOL/L — SIGNIFICANT CHANGE UP (ref 3.5–5.3)
POTASSIUM SERPL-SCNC: 3.7 MMOL/L — SIGNIFICANT CHANGE UP (ref 3.5–5.3)
PROT SERPL-MCNC: 7.3 G/DL — SIGNIFICANT CHANGE UP (ref 6–8.3)
PROT UR-MCNC: NEGATIVE MG/DL — SIGNIFICANT CHANGE UP
PROTHROM AB SERPL-ACNC: 11.9 SEC — SIGNIFICANT CHANGE UP (ref 10.6–13.6)
RBC # BLD: 3.8 M/UL — SIGNIFICANT CHANGE UP (ref 3.8–5.2)
RBC # FLD: 12.3 % — SIGNIFICANT CHANGE UP (ref 10.3–14.5)
RH IG SCN BLD-IMP: POSITIVE — SIGNIFICANT CHANGE UP
SARS-COV-2 RNA SPEC QL NAA+PROBE: SIGNIFICANT CHANGE UP
SODIUM SERPL-SCNC: 135 MMOL/L — SIGNIFICANT CHANGE UP (ref 135–145)
SP GR SPEC: 1.02 — SIGNIFICANT CHANGE UP (ref 1–1.03)
UROBILINOGEN FLD QL: 0.2 E.U./DL — SIGNIFICANT CHANGE UP
WBC # BLD: 12.01 K/UL — HIGH (ref 3.8–10.5)
WBC # FLD AUTO: 12.01 K/UL — HIGH (ref 3.8–10.5)

## 2021-02-16 PROCEDURE — 99223 1ST HOSP IP/OBS HIGH 75: CPT | Mod: 57,25

## 2021-02-16 PROCEDURE — 99285 EMERGENCY DEPT VISIT HI MDM: CPT

## 2021-02-16 RX ORDER — HYDROMORPHONE HYDROCHLORIDE 2 MG/ML
0.5 INJECTION INTRAMUSCULAR; INTRAVENOUS; SUBCUTANEOUS EVERY 4 HOURS
Refills: 0 | Status: DISCONTINUED | OUTPATIENT
Start: 2021-02-16 | End: 2021-02-16

## 2021-02-16 RX ORDER — KETOROLAC TROMETHAMINE 30 MG/ML
15 SYRINGE (ML) INJECTION ONCE
Refills: 0 | Status: DISCONTINUED | OUTPATIENT
Start: 2021-02-16 | End: 2021-02-16

## 2021-02-16 RX ORDER — SODIUM CHLORIDE 9 MG/ML
1000 INJECTION INTRAMUSCULAR; INTRAVENOUS; SUBCUTANEOUS
Refills: 0 | Status: DISCONTINUED | OUTPATIENT
Start: 2021-02-16 | End: 2021-02-17

## 2021-02-16 RX ORDER — TRAZODONE HCL 50 MG
50 TABLET ORAL AT BEDTIME
Refills: 0 | Status: DISCONTINUED | OUTPATIENT
Start: 2021-02-16 | End: 2021-02-18

## 2021-02-16 RX ORDER — TAMSULOSIN HYDROCHLORIDE 0.4 MG/1
0.4 CAPSULE ORAL ONCE
Refills: 0 | Status: COMPLETED | OUTPATIENT
Start: 2021-02-16 | End: 2021-02-16

## 2021-02-16 RX ORDER — MORPHINE SULFATE 50 MG/1
4 CAPSULE, EXTENDED RELEASE ORAL EVERY 4 HOURS
Refills: 0 | Status: DISCONTINUED | OUTPATIENT
Start: 2021-02-16 | End: 2021-02-16

## 2021-02-16 RX ORDER — LEVOTHYROXINE SODIUM 125 MCG
50 TABLET ORAL DAILY
Refills: 0 | Status: DISCONTINUED | OUTPATIENT
Start: 2021-02-16 | End: 2021-02-18

## 2021-02-16 RX ORDER — ONDANSETRON 8 MG/1
4 TABLET, FILM COATED ORAL ONCE
Refills: 0 | Status: COMPLETED | OUTPATIENT
Start: 2021-02-16 | End: 2021-02-16

## 2021-02-16 RX ORDER — LEVOTHYROXINE SODIUM 125 MCG
1 TABLET ORAL
Qty: 0 | Refills: 0 | DISCHARGE

## 2021-02-16 RX ORDER — ONDANSETRON 8 MG/1
4 TABLET, FILM COATED ORAL EVERY 8 HOURS
Refills: 0 | Status: DISCONTINUED | OUTPATIENT
Start: 2021-02-16 | End: 2021-02-18

## 2021-02-16 RX ORDER — OXYCODONE HYDROCHLORIDE 5 MG/1
5 TABLET ORAL EVERY 4 HOURS
Refills: 0 | Status: DISCONTINUED | OUTPATIENT
Start: 2021-02-16 | End: 2021-02-18

## 2021-02-16 RX ORDER — HYDROMORPHONE HYDROCHLORIDE 2 MG/ML
0.5 INJECTION INTRAMUSCULAR; INTRAVENOUS; SUBCUTANEOUS EVERY 4 HOURS
Refills: 0 | Status: DISCONTINUED | OUTPATIENT
Start: 2021-02-16 | End: 2021-02-18

## 2021-02-16 RX ORDER — ACETAMINOPHEN 500 MG
650 TABLET ORAL EVERY 6 HOURS
Refills: 0 | Status: DISCONTINUED | OUTPATIENT
Start: 2021-02-16 | End: 2021-02-18

## 2021-02-16 RX ORDER — SODIUM CHLORIDE 9 MG/ML
2000 INJECTION INTRAMUSCULAR; INTRAVENOUS; SUBCUTANEOUS ONCE
Refills: 0 | Status: COMPLETED | OUTPATIENT
Start: 2021-02-16 | End: 2021-02-16

## 2021-02-16 RX ORDER — TRAZODONE HCL 50 MG
50 TABLET ORAL
Qty: 0 | Refills: 0 | DISCHARGE

## 2021-02-16 RX ORDER — MORPHINE SULFATE 50 MG/1
4 CAPSULE, EXTENDED RELEASE ORAL ONCE
Refills: 0 | Status: DISCONTINUED | OUTPATIENT
Start: 2021-02-16 | End: 2021-02-16

## 2021-02-16 RX ADMIN — HYDROMORPHONE HYDROCHLORIDE 0.5 MILLIGRAM(S): 2 INJECTION INTRAMUSCULAR; INTRAVENOUS; SUBCUTANEOUS at 23:45

## 2021-02-16 RX ADMIN — OXYCODONE HYDROCHLORIDE 5 MILLIGRAM(S): 5 TABLET ORAL at 19:04

## 2021-02-16 RX ADMIN — SODIUM CHLORIDE 2000 MILLILITER(S): 9 INJECTION INTRAMUSCULAR; INTRAVENOUS; SUBCUTANEOUS at 08:38

## 2021-02-16 RX ADMIN — SODIUM CHLORIDE 150 MILLILITER(S): 9 INJECTION INTRAMUSCULAR; INTRAVENOUS; SUBCUTANEOUS at 11:15

## 2021-02-16 RX ADMIN — MORPHINE SULFATE 4 MILLIGRAM(S): 50 CAPSULE, EXTENDED RELEASE ORAL at 08:38

## 2021-02-16 RX ADMIN — ONDANSETRON 4 MILLIGRAM(S): 8 TABLET, FILM COATED ORAL at 08:38

## 2021-02-16 RX ADMIN — HYDROMORPHONE HYDROCHLORIDE 0.5 MILLIGRAM(S): 2 INJECTION INTRAMUSCULAR; INTRAVENOUS; SUBCUTANEOUS at 13:30

## 2021-02-16 RX ADMIN — MORPHINE SULFATE 4 MILLIGRAM(S): 50 CAPSULE, EXTENDED RELEASE ORAL at 16:20

## 2021-02-16 RX ADMIN — Medication 15 MILLIGRAM(S): at 08:38

## 2021-02-16 RX ADMIN — TAMSULOSIN HYDROCHLORIDE 0.4 MILLIGRAM(S): 0.4 CAPSULE ORAL at 11:15

## 2021-02-16 NOTE — ED ADULT TRIAGE NOTE - SPO2 (%)
ID Progress Note                [address] [facility] [address], [place], [address]            Phone [phone]     Fax [phone]      Chief complaint   Altered mental status  Subjective:  Manually disimpacted per nursing yesterday  No fevers overnight. Much more alert and verbally responsive. Son at bedside. Pt denies n/v, sob, pain. Occasional cough    Objective:    Vitals:    10/11/20 0905   BP: 135/75   Pulse: 81   Resp: 16   Temp: 97.2 °F (36.2 °C)   SpO2: 96%   General appearance:  much more alert and verbally rsponsive  skin: Warm and dry  Eyes: Pink palpebral conjunctivae. PERRL  Ears: External ears normal.  Nose/Sinuses: Nares normal. Septum midline. Mucosa normal.   Oropharynx: Oropharynx clear with no exudates seen  Neck: Neck supple. Lungs: CTA. Respirations even and unlabored on RA  Heart: S1 S2  Regular rate and rhythm. No rub, murmur or gallop  Abdomen: Abdomen soft, distension resolved. Nontender. +BS.  Peg no d/c or erythema  Extremities: no peripheral edema         Labs:  Recent Labs     10/09/20  0435 10/10/20  0905 10/10/20  1700   WBC 10.2 7.4  --    RBC 3.40* 3.10*  --    HGB 8.3* 7.5* 7.0*   HCT 26.8* 25.2* 22.8*   MCV 78.8* 81.3  --    MCH 24.4* 24.2*  --    MCHC 31.0* 29.8*  --    RDW 19.8* 20.6*  --     289  --    MPV 9.1 9.6  --      CMP:    Lab Results   Component Value Date     10/10/2020    K 3.4 10/10/2020    K 4.0 09/27/2020     10/10/2020    CO2 31 10/10/2020    BUN 15 10/10/2020    CREATININE 0.6 10/10/2020    GFRAA >60 10/10/2020    LABGLOM >60 10/10/2020    GLUCOSE 189 10/10/2020    GLUCOSE 147 03/07/2012    PROT 6.1 10/05/2020    LABALBU 2.2 10/05/2020    LABALBU 4.1 03/07/2012    CALCIUM 8.4 10/10/2020    BILITOT <0.2 10/05/2020    ALKPHOS 96 10/05/2020    AST 27 10/05/2020    ALT 9 10/05/2020          Microbiology :  10/8 blood cx no growth  10/5 blood cx E faecium  10/2 blood cx 1  Bottle CN staph   MRSA screen neg  10/2 urine cx <10k GPOs, yeast  Urine legionella neg      Radiology :       Assessment and Plan:      · Septic shock-resolved  · Aspiration pneumonia  · Leukocytosis from sepsis-resolved  · Coccygeal ulcer not infected  · Coag neg staph abctremia-  contaminatiom  · Delirium-patient remains encephalopathic  · VRE bacteremia -unclear source, UA from 10/2/2020  looks not concerning for urine infection     PLAN  -f/u repeat blood cx  - daptomycin started 10/7/2020   -Keep on meropenem , day #10 x4 more days  Right brachial has a midline  -BRITTANY will be done on Monday  -Neurology following as well recommended EEG and MRI    Electronically signed by IVON Zuniga - CNP on 10/11/2020 at 10:45 AM      Patient seen and examined. I had a face to face encounter with the patient. Agree with exam.  Assessment and plan as outlined above and directed by me. Addition and corrections were done as deemed appropriate. My exam and plan include: The patient continues to make slow improvement. She still has some degree of encephalopathy. Tolerating current antibiotics.     Franky Van  10/11/2020  1:33 PM 97

## 2021-02-16 NOTE — ED PROVIDER NOTE - OBJECTIVE STATEMENT
30F PMH hypothyroid, nephrolithiasis (2017, conservative management) p/w R flank pain. Pt was in ED yesterday for R flank pain and evaluated by me. WBC 11, other labs grossly wnl. hcg neg. UA no infection. CT w/ mild hydro, 2 small proximal ureteral stones. Felt better after meds and dc'd. Pain worsened, pt has been taking oxy w/ minimal relief. Also hasn't urinated at all since leaving ED. Also w/ increasing NBNB NV so returned to ED.  Denies f/c, diarrhea, black/bloody stool, dysuria, focal weakness/numbness, lightheadedness, rashes, joint pains, recent travel, recent antibiotic use, sick contacts, SOB/CP, rhinorrhea/nasal congestion, sore throat, cough.

## 2021-02-16 NOTE — ED ADULT NURSE REASSESSMENT NOTE - NS ED NURSE REASSESS COMMENT FT1
Received pt from night RN Findlator in Bed 18, in NAD, PIV placed, labs drawn and sent, medication administered as ordered.   Urology at bedside.

## 2021-02-16 NOTE — ED ADULT NURSE NOTE - OBJECTIVE STATEMENT
Per pt, c/o right flank pain and inability to urinate since yesterday.  Pt was seen at [facility] on 2/15/2021 in ED with the same problem and was discharged with prescription medication(oxycotin).

## 2021-02-16 NOTE — H&P ADULT - ATTENDING COMMENTS
renal colic with pain despite medical therapy and hydration  ct reviewed - 4-5 mm stone  discussed role cysto ureteroscopy laser lithotripsy stent insertion  need for stent reviewed  understands stent must be reviewed within a few weeks  risks infection up to sepsis, hematuria discussed  will schedule to OR

## 2021-02-16 NOTE — ED PROVIDER NOTE - CLINICAL SUMMARY MEDICAL DECISION MAKING FREE TEXT BOX
30F PMH hypothyroid, nephrolithiasis (2017, conservative management) p/w R flank pain. Pt was in ED yesterday for R flank pain and evaluated by me. WBC 11, other labs grossly wnl. hcg neg. UA no infection. CT w/ mild hydro, 2 small proximal ureteral stones. Felt better after meds and dc'd. Pain worsened, pt has been taking oxy w/ minimal relief. Also hasn't urinated at all since leaving ED. Also w/ increasing NBNB NV so returned to ED. No other systemic symptoms. Vitals wnl, exam as above.   ddx: renal colic  labs, IVF/symptom control.   Given worsening symptoms,  consulted. Will reassess.

## 2021-02-16 NOTE — H&P ADULT - NSHPLABSRESULTS_GEN_ALL_CORE
11.5   12. )-----------( 229      ( 2021 08:41 )             35.6   02-16    135  |  101  |  17  ----------------------------<  93  3.7   |  25  |  1.11    Ca    8.9      2021 08:41    TPro  7.3  /  Alb  4.1  /  TBili  0.4  /  DBili  x   /  AST  26  /  ALT  12  /  AlkPhos  69  02-16  PT/INR - ( 2021 08:41 )   PT: 11.9 sec;   INR: 0.99          PTT - ( 2021 08:41 )  PTT:25.3 sec  Urinalysis Basic - ( 2021 10:17 )    Color: Yellow / Appearance: Clear / S.025 / pH: x  Gluc: x / Ketone: NEGATIVE  / Bili: Negative / Urobili: 0.2 E.U./dL   Blood: x / Protein: NEGATIVE mg/dL / Nitrite: NEGATIVE   Leuk Esterase: NEGATIVE / RBC: x / WBC x   Sq Epi: x / Non Sq Epi: x / Bacteria: x    < from: CT Abdomen and Pelvis No Cont (02.15.21 @ 10:05) >      IMPRESSION:  Mild hydronephrosis and proximal hydroureter, with 2 proximal ureteral stones.  Additional nonobstructing punctate bilateral renal stones.      < end of copied text >

## 2021-02-16 NOTE — H&P ADULT - PROBLEM SELECTOR PLAN 1
-admit  -IVF's  -regular diet  -SCD's  -pain control  -if does not improve may require cystoscopy, stent placement  -strain urine

## 2021-02-16 NOTE — H&P ADULT - HISTORY OF PRESENT ILLNESS
29 yo female with h/o kidney stones (4 years ago which passed on its own), and hypothyroid (on synthroid), was seen yesterday in ER for severe R flank pain and N/V and was diagnosed with 2 Right proximal stones 2mm and 3mm with mild hydronephrosis. Patient was sent home after pain resolved with pain medication. Patient was sent home on PO oxycodone. Patient says pain not relieved with oxycodone and continues to be nauseous and not voiding much overnight. No abdominal pain. No fever/chills. No dysuria/hematuria. Patient received 2 litres bolus in ER as well as toradol, morphine, and zofran. Pain still 5/10.

## 2021-02-17 ENCOUNTER — RESULT REVIEW (OUTPATIENT)
Age: 31
End: 2021-02-17

## 2021-02-17 DIAGNOSIS — N20.1 CALCULUS OF URETER: ICD-10-CM

## 2021-02-17 LAB
ANION GAP SERPL CALC-SCNC: 7 MMOL/L — SIGNIFICANT CHANGE UP (ref 5–17)
BUN SERPL-MCNC: 12 MG/DL — SIGNIFICANT CHANGE UP (ref 7–23)
CALCIUM SERPL-MCNC: 8.2 MG/DL — LOW (ref 8.4–10.5)
CHLORIDE SERPL-SCNC: 107 MMOL/L — SIGNIFICANT CHANGE UP (ref 96–108)
CO2 SERPL-SCNC: 25 MMOL/L — SIGNIFICANT CHANGE UP (ref 22–31)
CREAT SERPL-MCNC: 1.04 MG/DL — SIGNIFICANT CHANGE UP (ref 0.5–1.3)
CULTURE RESULTS: SIGNIFICANT CHANGE UP
GLUCOSE SERPL-MCNC: 92 MG/DL — SIGNIFICANT CHANGE UP (ref 70–99)
HCT VFR BLD CALC: 33.4 % — LOW (ref 34.5–45)
HGB BLD-MCNC: 11 G/DL — LOW (ref 11.5–15.5)
MCHC RBC-ENTMCNC: 30.5 PG — SIGNIFICANT CHANGE UP (ref 27–34)
MCHC RBC-ENTMCNC: 32.9 GM/DL — SIGNIFICANT CHANGE UP (ref 32–36)
MCV RBC AUTO: 92.5 FL — SIGNIFICANT CHANGE UP (ref 80–100)
NRBC # BLD: 0 /100 WBCS — SIGNIFICANT CHANGE UP (ref 0–0)
PLATELET # BLD AUTO: 220 K/UL — SIGNIFICANT CHANGE UP (ref 150–400)
POTASSIUM SERPL-MCNC: 3.7 MMOL/L — SIGNIFICANT CHANGE UP (ref 3.5–5.3)
POTASSIUM SERPL-SCNC: 3.7 MMOL/L — SIGNIFICANT CHANGE UP (ref 3.5–5.3)
RBC # BLD: 3.61 M/UL — LOW (ref 3.8–5.2)
RBC # FLD: 12.2 % — SIGNIFICANT CHANGE UP (ref 10.3–14.5)
SODIUM SERPL-SCNC: 139 MMOL/L — SIGNIFICANT CHANGE UP (ref 135–145)
SPECIMEN SOURCE: SIGNIFICANT CHANGE UP
WBC # BLD: 11.78 K/UL — HIGH (ref 3.8–10.5)
WBC # FLD AUTO: 11.78 K/UL — HIGH (ref 3.8–10.5)

## 2021-02-17 PROCEDURE — 80053 COMPREHEN METABOLIC PANEL: CPT

## 2021-02-17 PROCEDURE — 52352 CYSTOURETERO W/STONE REMOVE: CPT | Mod: RT

## 2021-02-17 PROCEDURE — 96375 TX/PRO/DX INJ NEW DRUG ADDON: CPT

## 2021-02-17 PROCEDURE — 84702 CHORIONIC GONADOTROPIN TEST: CPT

## 2021-02-17 PROCEDURE — 85025 COMPLETE CBC W/AUTO DIFF WBC: CPT

## 2021-02-17 PROCEDURE — 99284 EMERGENCY DEPT VISIT MOD MDM: CPT | Mod: 25

## 2021-02-17 PROCEDURE — 74176 CT ABD & PELVIS W/O CONTRAST: CPT

## 2021-02-17 PROCEDURE — 87086 URINE CULTURE/COLONY COUNT: CPT

## 2021-02-17 PROCEDURE — U0005: CPT

## 2021-02-17 PROCEDURE — C2617: CPT

## 2021-02-17 PROCEDURE — 96374 THER/PROPH/DIAG INJ IV PUSH: CPT

## 2021-02-17 PROCEDURE — C1889: CPT

## 2021-02-17 PROCEDURE — 36415 COLL VENOUS BLD VENIPUNCTURE: CPT

## 2021-02-17 PROCEDURE — 81001 URINALYSIS AUTO W/SCOPE: CPT

## 2021-02-17 PROCEDURE — U0003: CPT

## 2021-02-17 PROCEDURE — 74420 UROGRAPHY RTRGR +-KUB: CPT | Mod: 26

## 2021-02-17 PROCEDURE — 88300 SURGICAL PATH GROSS: CPT | Mod: 26

## 2021-02-17 RX ORDER — SENNA PLUS 8.6 MG/1
2 TABLET ORAL AT BEDTIME
Refills: 0 | Status: DISCONTINUED | OUTPATIENT
Start: 2021-02-17 | End: 2021-02-18

## 2021-02-17 RX ORDER — SODIUM CHLORIDE 9 MG/ML
3 INJECTION INTRAMUSCULAR; INTRAVENOUS; SUBCUTANEOUS EVERY 8 HOURS
Refills: 0 | Status: DISCONTINUED | OUTPATIENT
Start: 2021-02-17 | End: 2021-02-18

## 2021-02-17 RX ADMIN — HYDROMORPHONE HYDROCHLORIDE 0.5 MILLIGRAM(S): 2 INJECTION INTRAMUSCULAR; INTRAVENOUS; SUBCUTANEOUS at 07:30

## 2021-02-17 RX ADMIN — SODIUM CHLORIDE 3 MILLILITER(S): 9 INJECTION INTRAMUSCULAR; INTRAVENOUS; SUBCUTANEOUS at 21:10

## 2021-02-17 RX ADMIN — HYDROMORPHONE HYDROCHLORIDE 0.5 MILLIGRAM(S): 2 INJECTION INTRAMUSCULAR; INTRAVENOUS; SUBCUTANEOUS at 03:30

## 2021-02-17 RX ADMIN — SODIUM CHLORIDE 150 MILLILITER(S): 9 INJECTION INTRAMUSCULAR; INTRAVENOUS; SUBCUTANEOUS at 07:31

## 2021-02-17 RX ADMIN — OXYCODONE HYDROCHLORIDE 5 MILLIGRAM(S): 5 TABLET ORAL at 06:08

## 2021-02-17 RX ADMIN — OXYCODONE HYDROCHLORIDE 5 MILLIGRAM(S): 5 TABLET ORAL at 11:11

## 2021-02-17 RX ADMIN — Medication 50 MICROGRAM(S): at 05:34

## 2021-02-17 RX ADMIN — SENNA PLUS 2 TABLET(S): 8.6 TABLET ORAL at 21:10

## 2021-02-17 RX ADMIN — HYDROMORPHONE HYDROCHLORIDE 0.5 MILLIGRAM(S): 2 INJECTION INTRAMUSCULAR; INTRAVENOUS; SUBCUTANEOUS at 12:00

## 2021-02-17 NOTE — DIETITIAN NUTRITION RISK NOTIFICATION - TREATMENT: THE FOLLOWING DIET HAS BEEN RECOMMENDED
Of note, this writer introduced myself as the dietitian and the pt started crying.  She feels she has gained so much weight from the IV fluids that she does not recognize herself.  RD reassured pt that the fluids were only temporary.  She was almost obsessive about how long it would take for the water weight to go away, that she feels obese and asking if there were calories in the fluid.  Pt was asking if she should avoid salt to prevent further fluid retention and when she could start exercising again.  Pt also states that she does not want to eat food from the hospital.  Some aspect of disordered eating may be present vs stress of current situation.  If pt does refuse to eat after procedure and continues to obsess over IVF, consider psych consult.

## 2021-02-17 NOTE — DIETITIAN NUTRITION RISK NOTIFICATION - ADDITIONAL COMMENTS/DIETITIAN RECOMMENDATIONS
Pt reports a generally nutritious diet; largest meal being lunch and exercises daily.  Pt denies any recent weight change.  Pt with no s/s malnutrition on NFPE, however, pt reports significant fluid retention at present so may consider reevaluating upon f/u.

## 2021-02-17 NOTE — DIETITIAN INITIAL EVALUATION ADULT. - PERSON TAUGHT/METHOD
Pt is understanding of current NPO status.  Majority of interview was spent reassuring pt regarding current IVF infusion.  No further nutrition education able to be completed.  Will continue to monitor and f/u per policy.

## 2021-02-17 NOTE — DIETITIAN INITIAL EVALUATION ADULT. - ADD RECOMMEND
1. Resume PO diet within 24 hrs of procedure  2. Consider clear liquid diet with advancement back to Regular diet pending tolerance  3. Encourage adequate PO hydration as feasible  4. Pain management per team

## 2021-02-17 NOTE — BRIEF OPERATIVE NOTE - NSICDXBRIEFPROCEDURE_GEN_ALL_CORE_FT
PROCEDURES:  Cystoscopic insertion of right ureteral stent 17-Feb-2021 18:06:27  Chriss Saul  Ureteroscopy, flexible or rigid, with stone basketing 17-Feb-2021 18:06:02  Chriss Saul

## 2021-02-17 NOTE — DIETITIAN INITIAL EVALUATION ADULT. - OTHER CALCULATIONS
ABW used to calculate energy needs due to pt's current body weight within % IBW (84%).  Needs calculated for age and increased slightly for minimal would healing.

## 2021-02-17 NOTE — DIETITIAN INITIAL EVALUATION ADULT. - OTHER INFO
Pt with past medical history significant for kidney stones and hypothyroidism.  Pt presents with severe right flank pain, n/v; noted with (2) right proximal stones with mild hydronephrosis and renal colic.  Pt is planned for OR today for cytoscopy and right stent placement.    Pt endorses good appetite/intake at baseline.  No diet modifications; does not eat fish.  Pt reports a generally nutritious diet; largest meal being lunch and exercises daily.  Pt denies any recent weight change.  Pt with no s/s malnutrition on NFPE, however, pt reports significant fluid retention at present so may consider reevaluating upon f/u.   Pt's last real meal was Sunday evening; pain began ~2 hrs after eating.  Pt with minimal intake x3 days secondary to pain.  Pt reports eating a little bit of a bagel yesterday evening.  Pt has since been NPO after midnight for OR this afternoon.    Of note, this writer introduced myself as the dietitian and the pt started crying.  She feels she has gained so much weight from the IV fluids that she does not recognize herself.  RD reassured pt that the fluids were only temporary.  She was almost obsessive about how long it would take for the water weight to go away, that she feels obese and asking if there were calories in the fluid.  Pt was asking if she should avoid salt to prevent further fluid retention and when she could start exercising again.  Pt also states that she does not want to eat food from the hospital.  Some aspect of disordered eating may be present vs stress of current situation.  If pt does refuse to eat after procedure and continues to obsess over IVF, consider psych consult.

## 2021-02-18 ENCOUNTER — TRANSCRIPTION ENCOUNTER (OUTPATIENT)
Age: 31
End: 2021-02-18

## 2021-02-18 VITALS
DIASTOLIC BLOOD PRESSURE: 70 MMHG | OXYGEN SATURATION: 100 % | HEART RATE: 74 BPM | TEMPERATURE: 98 F | RESPIRATION RATE: 18 BRPM | SYSTOLIC BLOOD PRESSURE: 108 MMHG

## 2021-02-18 PROCEDURE — 85610 PROTHROMBIN TIME: CPT

## 2021-02-18 PROCEDURE — 36415 COLL VENOUS BLD VENIPUNCTURE: CPT

## 2021-02-18 PROCEDURE — 87086 URINE CULTURE/COLONY COUNT: CPT

## 2021-02-18 PROCEDURE — 85025 COMPLETE CBC W/AUTO DIFF WBC: CPT

## 2021-02-18 PROCEDURE — 96375 TX/PRO/DX INJ NEW DRUG ADDON: CPT

## 2021-02-18 PROCEDURE — 82365 CALCULUS SPECTROSCOPY: CPT

## 2021-02-18 PROCEDURE — C2617: CPT

## 2021-02-18 PROCEDURE — 81003 URINALYSIS AUTO W/O SCOPE: CPT

## 2021-02-18 PROCEDURE — 87635 SARS-COV-2 COVID-19 AMP PRB: CPT

## 2021-02-18 PROCEDURE — U0005: CPT

## 2021-02-18 PROCEDURE — C1889: CPT

## 2021-02-18 PROCEDURE — 99285 EMERGENCY DEPT VISIT HI MDM: CPT | Mod: 25

## 2021-02-18 PROCEDURE — 80048 BASIC METABOLIC PNL TOTAL CA: CPT

## 2021-02-18 PROCEDURE — 96374 THER/PROPH/DIAG INJ IV PUSH: CPT

## 2021-02-18 PROCEDURE — 86901 BLOOD TYPING SEROLOGIC RH(D): CPT

## 2021-02-18 PROCEDURE — 86850 RBC ANTIBODY SCREEN: CPT

## 2021-02-18 PROCEDURE — 85027 COMPLETE CBC AUTOMATED: CPT

## 2021-02-18 PROCEDURE — 88300 SURGICAL PATH GROSS: CPT

## 2021-02-18 PROCEDURE — 86900 BLOOD TYPING SEROLOGIC ABO: CPT

## 2021-02-18 PROCEDURE — 85730 THROMBOPLASTIN TIME PARTIAL: CPT

## 2021-02-18 PROCEDURE — 80053 COMPREHEN METABOLIC PANEL: CPT

## 2021-02-18 RX ORDER — OXYBUTYNIN CHLORIDE 5 MG
1 TABLET ORAL
Qty: 10 | Refills: 0
Start: 2021-02-18 | End: 2021-02-27

## 2021-02-18 RX ORDER — FUROSEMIDE 40 MG
20 TABLET ORAL ONCE
Refills: 0 | Status: COMPLETED | OUTPATIENT
Start: 2021-02-18 | End: 2021-02-18

## 2021-02-18 RX ORDER — OXYCODONE AND ACETAMINOPHEN 5; 325 MG/1; MG/1
1 TABLET ORAL
Qty: 8 | Refills: 0
Start: 2021-02-18 | End: 2021-02-22

## 2021-02-18 RX ORDER — DOCUSATE SODIUM 100 MG
1 CAPSULE ORAL
Qty: 20 | Refills: 0
Start: 2021-02-18 | End: 2021-02-27

## 2021-02-18 RX ADMIN — SODIUM CHLORIDE 3 MILLILITER(S): 9 INJECTION INTRAMUSCULAR; INTRAVENOUS; SUBCUTANEOUS at 06:00

## 2021-02-18 RX ADMIN — Medication 20 MILLIGRAM(S): at 07:30

## 2021-02-18 RX ADMIN — Medication 50 MICROGRAM(S): at 06:00

## 2021-02-18 NOTE — DISCHARGE NOTE NURSING/CASE MANAGEMENT/SOCIAL WORK - PATIENT PORTAL LINK FT
You can access the FollowMyHealth Patient Portal offered by [facility] by registering at the following website: [email or web address]. By joining [employer]’s FollowMyHealth portal, you will also be able to view your health information using other applications (apps) compatible with our system.

## 2021-02-18 NOTE — DISCHARGE NOTE PROVIDER - HOSPITAL COURSE
29 yo Female w/ right 2 and 3 mm proximal stones s/p cystoscopy and Right stent placement 2/17. Patient is afebrile, hemodynamically stable, pain controlled and optimized for discharge home.

## 2021-02-18 NOTE — PROGRESS NOTE ADULT - ASSESSMENT
30yFemale w/ right 2 and 3 mm proximal stones s/p cystoscopy and Right stent placement 2/17    PLAN:  Diet: reg  Pain control  Monitor Urine Output  DVT ppx  SCD's  continue to monitor  
30yFemale w/ right 2 and 3 mm proximal stones s/p cystoscopy and Right stent placement 2/17    PLAN:  Diet: reg  Pain control  Monitor Urine Output  DVT ppx  SCD's  continue to monitor

## 2021-02-18 NOTE — PROGRESS NOTE ADULT - NUTRITIONAL ASSESSMENT
This patient has been assessed with a concern for Malnutrition and has been determined to have a diagnosis/diagnoses of Underweight (BMI < 19).    This patient is being managed with:   Diet Regular-  Entered: Feb 16 2021 10:47AM    
This patient has been assessed with a concern for Malnutrition and has been determined to have a diagnosis/diagnoses of Underweight (BMI < 19).    This patient is being managed with:   Diet Regular-  Entered: Feb 16 2021 10:47AM

## 2021-02-18 NOTE — DISCHARGE NOTE PROVIDER - NSDCFUADDINST_GEN_ALL_CORE_FT
-Follow up with Dr. Polanco on Tuesday  -Use Tylenol for primary pain control, if you experience severe pain not alleviated by Tylenol use Percocet 5-325mg prescribed q6hrs as needed  -Oxybutynin q8hrs may be used for stent related discomfort only as needed  -use Colace BID as needed for constipation relief    General Discharge Instructions:  Use Tylenol for pain control as needed  Please resume all regular home medications unless specifically advised not to take a particular medication. Also, please take any new medications as prescribed.  Please get plenty of rest, continue to ambulate several times per day, and drink adequate amounts of fluids.     Warning Signs:  Please call your doctor if you experience the following:  *You experience new chest pain, pressure, squeezing or tightness.  *New or worsening cough, shortness of breath, or wheeze.  *If you are vomiting and cannot keep down fluids or your medications.  *You are getting dehydrated due to continued vomiting, diarrhea, or other reasons. Signs of dehydration include dry mouth, rapid heartbeat, or feeling dizzy or faint when standing.  *You see blood or dark/black material when you vomit or have a bowel movement.  *You experience burning when you urinate, have blood in your urine, or experience a discharge.  *Your pain is not improving within 8-12 hours or is not gone within 24 hours. Call or return immediately if your pain is getting worse, changes location, or moves to your chest or back.  *You have shaking chills, or fever greater than 100.4 degrees Fahrenheit.  *Any change in your symptoms, or any new symptoms that concern you.

## 2021-02-18 NOTE — DISCHARGE NOTE PROVIDER - CARE PROVIDER_API CALL
Arianna Polanco)  Urology  [address]  [place], [place] [address]  Phone: [phone]  Fax: [phone]  Follow Up Time:

## 2021-02-18 NOTE — DISCHARGE NOTE PROVIDER - DETAILS OF MALNUTRITION DIAGNOSIS/DIAGNOSES
This patient has been assessed with a concern for Malnutrition and was treated during this hospitalization for the following Nutrition diagnosis/diagnoses:     -  02/17/2021: Underweight (BMI < 19)

## 2021-02-18 NOTE — DISCHARGE NOTE PROVIDER - NSDCCPTREATMENT_GEN_ALL_CORE_FT
PRINCIPAL PROCEDURE  Procedure: Cystoscopic insertion of right ureteral stent  Findings and Treatment:       SECONDARY PROCEDURE  Procedure: Ureteroscopy, flexible or rigid, with stone basketing  Findings and Treatment:

## 2021-02-18 NOTE — PROGRESS NOTE ADULT - SUBJECTIVE AND OBJECTIVE BOX
UROLOGY POST OP NOTE (PAGER # [phone])    PROCEDURE: cysto, R stent    T(C): 37.1 (02-17-21 @ 20:38), Max: 37.3 (02-17-21 @ 13:22)  HR: 73 (02-17-21 @ 20:38) (65 - 84)  BP: 116/83 (02-17-21 @ 20:38) (116/83 - 135/78)  RR: 18 (02-17-21 @ 20:38) (14 - 18)  SpO2: 94% (02-17-21 @ 20:38) (93% - 99%)  Wt(kg): --    Subjective: pain controlled. Denies N, V, CP, SOB, fevers, chills  ON PE: awake and alert    Abdomen: nontender, nondistended    : no suprapubic/CVAT                          11.0   11.78 )-----------( 220      ( 17 Feb 2021 07:19 )             33.4     02-17    139  |  107  |  12  ----------------------------<  92  3.7   |  25  |  1.04    Ca    8.2<L>      17 Feb 2021 07:19    TPro  7.3  /  Alb  4.1  /  TBili  0.4  /  DBili  x   /  AST  26  /  ALT  12  /  AlkPhos  69  02-16      A/P:
INTERVAL HPI/OVERNIGHT EVENTS:  No acute events overnight.    VITALS:    T(F): 98.5 (21 @ 05:42), Max: 99.1 (21 @ 16:22)  HR: 74 (21 @ 05:42) (57 - 85)  BP: 126/79 (21 @ 05:42) (118/78 - 128/82)  RR: 18 (21 @ 05:42) (16 - 18)  SpO2: 99% (21 @ 05:42) (94% - 100%)  Wt(kg): --    I&O's Detail    2021 07:01  -  2021 06:26  --------------------------------------------------------  IN:    sodium chloride 0.9%: 1800 mL  Total IN: 1800 mL    OUT:    Voided (mL): 800 mL  Total OUT: 800 mL    Total NET: 1000 mL          MEDICATIONS:    ANTIBIOTICS:      PAIN CONTROL:  acetaminophen   Tablet .. 650 milliGRAM(s) Oral every 6 hours PRN  HYDROmorphone  Injectable 0.5 milliGRAM(s) IV Push every 4 hours PRN  ondansetron Injectable 4 milliGRAM(s) IV Push every 8 hours PRN  oxyCODONE    IR 5 milliGRAM(s) Oral every 4 hours PRN  traZODone 50 milliGRAM(s) Oral at bedtime PRN       MEDS:      HEME/ONC        PHYSICAL EXAM:  General: No acute distress.  Alert and Oriented x 3  Abdominal Exam: soft nt/nd. Right side CVAT.   Exam: Negative SP tenderness.       LABS:                        11.5   12.01 )-----------( 229      ( 2021 08:41 )             35.6     02-    135  |  101  |  17  ----------------------------<  93  3.7   |  25  |  1.11    Ca    8.9      2021 08:41    TPro  7.3  /  Alb  4.1  /  TBili  0.4  /  DBili  x   /  AST  26  /  ALT  12  /  AlkPhos  69  02-16    PT/INR - ( 2021 08:41 )   PT: 11.9 sec;   INR: 0.99          PTT - ( 2021 08:41 )  PTT:25.3 sec  Urinalysis Basic - ( 2021 10:17 )    Color: Yellow / Appearance: Clear / S.025 / pH: x  Gluc: x / Ketone: NEGATIVE  / Bili: Negative / Urobili: 0.2 E.U./dL   Blood: x / Protein: NEGATIVE mg/dL / Nitrite: NEGATIVE   Leuk Esterase: NEGATIVE / RBC: x / WBC x   Sq Epi: x / Non Sq Epi: x / Bacteria: x        RADIOLOGY & ADDITIONAL TESTS:    ASSESSMENT: 30yFemale w/ right 2 and 3 mm proximal stones added on for OR cystoscopy and Right stent placement.      PLAN:  Diet: NPO  Pain control  Monitor Urine Output  DVT ppx  OOB/IS  
 AM Note    No acute events overnight.      Vital Signs Last 24 Hrs  T(C): 36.7 (02-18-21 @ 06:03), Max: 37.3 (02-17-21 @ 13:22)  T(F): 98.1 (02-18-21 @ 06:03), Max: 99.2 (02-17-21 @ 13:22)  HR: 65 (02-18-21 @ 06:03) (65 - 84)  BP: 130/81 (02-18-21 @ 06:03) (116/83 - 135/78)  BP(mean): 99 (02-17-21 @ 18:59) (93 - 99)  RR: 19 (02-18-21 @ 06:03) (14 - 19)  SpO2: 97% (02-18-21 @ 06:03) (93% - 99%)     17 Feb 2021 07:19    139    |  107    |  12     ----------------------------<  92     3.7     |  25     |  1.04     Ca    8.2        17 Feb 2021 07:19    TPro  7.3    /  Alb  4.1    /  TBili  0.4    /  DBili  x      /  AST  26     /  ALT  12     /  AlkPhos  69     16 Feb 2021 08:41                          11.0   11.78 )-----------( 220      ( 17 Feb 2021 07:19 )             33.4         I&O's Summary    16 Feb 2021 07:01  -  17 Feb 2021 07:00  --------------------------------------------------------  IN: 1800 mL / OUT: 1300 mL / NET: 500 mL    17 Feb 2021 07:01  -  18 Feb 2021 06:23  --------------------------------------------------------  IN: 240 mL / OUT: 1600 mL / NET: -1360 mL          PHYSICAL EXAM:    GEN: awake and alert  ABD: nontender, nondistended  : no suprapubic/CVAT

## 2021-02-18 NOTE — DISCHARGE NOTE PROVIDER - NSDCCPCAREPLAN_GEN_ALL_CORE_FT
PRINCIPAL DISCHARGE DIAGNOSIS  Diagnosis: Nephrolithiasis  Assessment and Plan of Treatment: Follow up with Dr. Polanco      SECONDARY DISCHARGE DIAGNOSES  Diagnosis: Hypothyroid  Assessment and Plan of Treatment: Hypothyroid. Follow up with PCP

## 2021-02-18 NOTE — DISCHARGE NOTE PROVIDER - NSDCMRMEDTOKEN_GEN_ALL_CORE_FT
acyclovir 5% topical cream: Apply topically to affected area 5 times a day   Colace 100 mg oral capsule: 1 cap(s) orally 2 times a day   Lidocaine Viscous 2% mucous membrane solution: Apply topically to affected area 4 times a day (before meals and at bedtime)   oxybutynin 5 mg oral tablet: 1 tab(s) orally 3 times a day   Percocet 5 mg-325 mg oral tablet: 1 tab(s) orally every 6 hours MDD:4  Synthroid 50 mcg (0.05 mg) oral tablet: 1 tab(s) orally once a day  traZODone 50 mg oral tablet: 50 milligram(s) orally once a day (at bedtime)

## 2021-02-19 ENCOUNTER — NON-APPOINTMENT (OUTPATIENT)
Age: 31
End: 2021-02-19

## 2021-02-19 PROBLEM — E03.9 HYPOTHYROIDISM, UNSPECIFIED: Chronic | Status: ACTIVE | Noted: 2021-02-16

## 2021-02-19 PROBLEM — N20.0 CALCULUS OF KIDNEY: Chronic | Status: ACTIVE | Noted: 2021-02-16

## 2021-02-22 LAB — SURGICAL PATHOLOGY STUDY: SIGNIFICANT CHANGE UP

## 2021-02-23 DIAGNOSIS — Z88.0 ALLERGY STATUS TO PENICILLIN: ICD-10-CM

## 2021-02-23 DIAGNOSIS — Z88.1 ALLERGY STATUS TO OTHER ANTIBIOTIC AGENTS STATUS: ICD-10-CM

## 2021-02-23 DIAGNOSIS — N23 UNSPECIFIED RENAL COLIC: ICD-10-CM

## 2021-02-23 DIAGNOSIS — N13.2 HYDRONEPHROSIS WITH RENAL AND URETERAL CALCULOUS OBSTRUCTION: ICD-10-CM

## 2021-02-23 DIAGNOSIS — R63.6 UNDERWEIGHT: ICD-10-CM

## 2021-02-23 DIAGNOSIS — E03.9 HYPOTHYROIDISM, UNSPECIFIED: ICD-10-CM

## 2021-02-23 LAB — NIDUS STONE QN: SIGNIFICANT CHANGE UP

## 2021-02-25 ENCOUNTER — APPOINTMENT (OUTPATIENT)
Dept: UROLOGY | Facility: CLINIC | Age: 31
End: 2021-02-25
Payer: COMMERCIAL

## 2021-02-25 ENCOUNTER — EMERGENCY (EMERGENCY)
Facility: HOSPITAL | Age: 31
LOS: 1 days | Discharge: HOME | End: 2021-02-25
Admitting: EMERGENCY MEDICINE
Payer: COMMERCIAL

## 2021-02-25 VITALS
HEART RATE: 92 BPM | WEIGHT: 115 LBS | DIASTOLIC BLOOD PRESSURE: 58 MMHG | BODY MASS INDEX: 17.49 KG/M2 | SYSTOLIC BLOOD PRESSURE: 87 MMHG | TEMPERATURE: 97.7 F

## 2021-02-25 VITALS
SYSTOLIC BLOOD PRESSURE: 98 MMHG | HEIGHT: 68 IN | RESPIRATION RATE: 19 BRPM | DIASTOLIC BLOOD PRESSURE: 63 MMHG | HEART RATE: 104 BPM | TEMPERATURE: 98 F | OXYGEN SATURATION: 99 %

## 2021-02-25 DIAGNOSIS — Z79.899 OTHER LONG TERM (CURRENT) DRUG THERAPY: ICD-10-CM

## 2021-02-25 DIAGNOSIS — Z88.1 ALLERGY STATUS TO OTHER ANTIBIOTIC AGENTS STATUS: ICD-10-CM

## 2021-02-25 DIAGNOSIS — Z20.822 CONTACT WITH AND (SUSPECTED) EXPOSURE TO COVID-19: ICD-10-CM

## 2021-02-25 DIAGNOSIS — Z79.891 LONG TERM (CURRENT) USE OF OPIATE ANALGESIC: ICD-10-CM

## 2021-02-25 DIAGNOSIS — Z88.0 ALLERGY STATUS TO PENICILLIN: ICD-10-CM

## 2021-02-25 LAB — SARS-COV-2 RNA SPEC QL NAA+PROBE: SIGNIFICANT CHANGE UP

## 2021-02-25 PROCEDURE — U0003: CPT

## 2021-02-25 PROCEDURE — U0005: CPT

## 2021-02-25 PROCEDURE — 99282 EMERGENCY DEPT VISIT SF MDM: CPT

## 2021-02-25 PROCEDURE — 99072 ADDL SUPL MATRL&STAF TM PHE: CPT

## 2021-02-25 PROCEDURE — 52310 CYSTOSCOPY AND TREATMENT: CPT

## 2021-02-25 PROCEDURE — 99283 EMERGENCY DEPT VISIT LOW MDM: CPT

## 2021-02-25 NOTE — ED PROVIDER NOTE - PATIENT PORTAL LINK FT
You can access the FollowMyHealth Patient Portal offered by [facility] by registering at the following website: [email or web address]. By joining [employer]’s FollowMyHealth portal, you will also be able to view your health information using other applications (apps) compatible with our system.

## 2021-02-25 NOTE — ED PROVIDER NOTE - NS ED ROS FT
well developed
CONSTITUTIONAL:  No fever or chills, no bodyaches  HEENT:  No sore throat or headache, no nasal congestion  PULM:  No cough or shortness of breath  GI:  No diarrhea, no vomiting

## 2021-03-02 ENCOUNTER — NON-APPOINTMENT (OUTPATIENT)
Age: 31
End: 2021-03-02

## 2021-03-22 ENCOUNTER — EMERGENCY (EMERGENCY)
Facility: HOSPITAL | Age: 31
LOS: 1 days | Discharge: HOME | End: 2021-03-22
Admitting: EMERGENCY MEDICINE
Payer: COMMERCIAL

## 2021-03-22 VITALS
DIASTOLIC BLOOD PRESSURE: 75 MMHG | OXYGEN SATURATION: 100 % | HEIGHT: 68 IN | RESPIRATION RATE: 16 BRPM | SYSTOLIC BLOOD PRESSURE: 113 MMHG | TEMPERATURE: 98 F | HEART RATE: 58 BPM

## 2021-03-22 DIAGNOSIS — Z88.0 ALLERGY STATUS TO PENICILLIN: ICD-10-CM

## 2021-03-22 DIAGNOSIS — Z79.2 LONG TERM (CURRENT) USE OF ANTIBIOTICS: ICD-10-CM

## 2021-03-22 DIAGNOSIS — Z20.822 CONTACT WITH AND (SUSPECTED) EXPOSURE TO COVID-19: ICD-10-CM

## 2021-03-22 DIAGNOSIS — Z88.1 ALLERGY STATUS TO OTHER ANTIBIOTIC AGENTS STATUS: ICD-10-CM

## 2021-03-22 DIAGNOSIS — Z79.890 HORMONE REPLACEMENT THERAPY: ICD-10-CM

## 2021-03-22 DIAGNOSIS — Z79.1 LONG TERM (CURRENT) USE OF NON-STEROIDAL ANTI-INFLAMMATORIES (NSAID): ICD-10-CM

## 2021-03-22 DIAGNOSIS — Z79.83 LONG TERM (CURRENT) USE OF BISPHOSPHONATES: ICD-10-CM

## 2021-03-22 DIAGNOSIS — Z79.891 LONG TERM (CURRENT) USE OF OPIATE ANALGESIC: ICD-10-CM

## 2021-03-22 LAB — SARS-COV-2 RNA SPEC QL NAA+PROBE: SIGNIFICANT CHANGE UP

## 2021-03-22 PROCEDURE — 99282 EMERGENCY DEPT VISIT SF MDM: CPT

## 2021-03-22 PROCEDURE — U0005: CPT

## 2021-03-22 PROCEDURE — U0003: CPT

## 2021-03-22 PROCEDURE — 99283 EMERGENCY DEPT VISIT LOW MDM: CPT

## 2021-03-22 NOTE — ED PROVIDER NOTE - NS_EDPROVIDERDISPOUSERTYPE_ED_A_ED
100 I have personally evaluated and examined the patient. The Attending was available to me as a supervising provider if needed.

## 2021-03-22 NOTE — ED PROVIDER NOTE - PATIENT PORTAL LINK FT
You can access the FollowMyHealth Patient Portal offered by [facility] by registering at the following website: [email or web address]. By joining [employer]’s FollowMyHealth portal, you will also be able to view your health information using other applications (apps) compatible with our system.

## 2021-03-26 ENCOUNTER — APPOINTMENT (OUTPATIENT)
Dept: UROLOGY | Facility: CLINIC | Age: 31
End: 2021-03-26
Payer: COMMERCIAL

## 2021-03-26 VITALS
TEMPERATURE: 98.1 F | WEIGHT: 116 LBS | BODY MASS INDEX: 17.58 KG/M2 | HEIGHT: 68 IN | DIASTOLIC BLOOD PRESSURE: 74 MMHG | HEART RATE: 59 BPM | SYSTOLIC BLOOD PRESSURE: 108 MMHG

## 2021-03-26 PROCEDURE — 99213 OFFICE O/P EST LOW 20 MIN: CPT

## 2021-03-26 PROCEDURE — 99072 ADDL SUPL MATRL&STAF TM PHE: CPT

## 2021-03-26 NOTE — ASSESSMENT
[FreeTextEntry1] : ureteral stone\par I spoke with the patient about diet modification at length, which includes:\par \par Increasing fluid intake to produce 2 to 2.5 liters of urine per day (approx 3 liters intake), and should be primarily water.  \par \par Calcium intake should be approximately 1000 mg per day.  \par \par Oxalate intake should be reduced- most common sources in diet which have very high levels include peanuts/nuts, tea, coffee, chocolate, spinach, beets, rhubarb, swiss chard.  I've also given/directed to a list with other high oxalate.\par   \par Animal flesh protein should be controlled- approx 4 to 6 ounces per day, with some vegetarian days included in the week.  Studies have shown that vegetarians have half the risk of stones of people who eat only 4 ounces per day of meat or fish of any kind (beef, chicken, fish, shellfish, pork, etc), indicating that a vegetarian lifestyle can decrease future stone risks.\par \par Finally, salt intake should be reduced as high levels in the diet will increase urinary calcium.  <2400 mg per day on a low sodium diet is strongly recommended.\par \par Citrate is a benefit; piero and limes with most citrate and least sugar- recommend 'a lemon or lime a day'; easiest with concentrate, mixed into water or other beverages.\par \par Lifestyle changes: regular exercise and weight loss both are independent risk-reducers for stones.\par \par In addition, for further details online, go to [email or web address] <[email or web address]> <<[email or web address]>> ---> in the lower left column there is a link for "diet resources", and review or download.\par \par for 24 hour urine collection x 2\par referred to nephrology for further eval following litholink

## 2021-03-26 NOTE — HISTORY OF PRESENT ILLNESS
I think you can probably treat this without any antibiotics.  However, if you experience worsening pain, if finger becomes red and warm, begin taking clindamycin by mouth. Ibuprofen/Tylenol as needed for pain. Keep wound covered. Change dressings twice daily. Apply antibiotic ointment with dressing changes. Do not soak wound. Keep area dry.     Please follow-up with your primary later this week or early next week for wound reevaluation. Please return to this urgent care if symptoms persist or worsen despite treatment, if any other problems occur.  Abscess (Incision & Drainage)  An abscess is sometimes called a boil. It happens when bacteria get trapped under the skin and start to grow. Pus forms inside the abscess as the body responds to the bacteria. An abscess can happen with an insect bite, ingrown hair, blocked oil gland, pimple, cyst, or puncture wound.  Your healthcare provider has drained the pus from your abscess. If the abscess pocket was large, your healthcare provider may have put in gauze packing. Your provider will need to remove it on your next visit. He or she may also replace it at that time. You may not need antibiotics to treat a simple abscess, unless the infection is spreading into the skin around the wound (cellulitis).  The wound will take about 1 to 2 weeks to heal, depending on the size of the abscess. Healthy tissue will grow from the bottom and sides of the opening until it seals over.  Home care  These tips can help your wound heal:  · The wound may drain for the first 2 days. Cover the wound with a clean dry dressing. Change the dressing if it becomes soaked with blood or pus.  · If a gauze packing was placed inside the abscess pocket, you may be told to remove it yourself. You may do this in the shower. Once the packing is removed, you should wash the area in the shower, or clean the area as directed by your provider. Continue to do this until the skin opening has closed. Make sure you  [FreeTextEntry1] : s/p ureteroscopic stone extraction\par pain resolved\par renal sono no residual stones or hydro\par stone 100% CaOX\par persistent fatigue\par  wash your hands after changing the packing or cleaning the wound.  · If you were prescribed antibiotics, take them as directed until they are all gone.  · You may use acetaminophen or ibuprofen to control pain, unless another pain medicine was prescribed. If you have liver disease or ever had a stomach ulcer, talk with your doctor before using these medicines.  Follow-up care  Follow up with your healthcare provider, or as advised. If a gauze packing was put in your wound, it should be removed in 1 to 2 days. Check your wound every day for any signs that the infection is getting worse. The signs are listed below.  When to seek medical advice  Call your healthcare provider right away if any of these occur:  · Increasing redness or swelling  · Red streaks in the skin leading away from the wound  · Increasing local pain or swelling  · Continued pus draining from the wound 2 days after treatment  · Fever of 100.4ºF (38ºC) or higher, or as directed by your healthcare provider  · Boil returns when you are at home  Date Last Reviewed: 9/1/2016 © 2000-2017 [employer]. [address] [place] [address]. All rights reserved. This information is not intended as a substitute for professional medical care. Always follow your healthcare professional's instructions.        Wound Check, Infection  You have a wound that has become infected. The wound will not heal properly unless the infection is cleared. Infection in a wound may also spread if it is not treated. In most cases, antibiotic medicines are prescribed to treat a wound infection.   Symptoms of a wound infection include:  · Redness or swelling around the wound  · Warmth coming from the wound  · New or worsening pain  · Red streaks around the wound  · Draining pus  · Fever  Home care  Follow all directions you are given to treat the infection.  Medicines  Take all medicines as prescribed.   · If you were given antibiotics, take them until they are  gone or your healthcare provider tells you to stop. It is vital to finish the antibiotics even if you feel better. If you do not finish them, the infection may come back and be harder to treat.  · If your infection is not responding to the medicines you are taking, you may be prescribed new medicines.  · Take medicine for pain as directed by your healthcare provider.  Wound care  Care for your wound as directed by your healthcare provider.  · Apply a warm compress (clean cloth soaked in hot water) to the infected area for about 5 to 10 minutes at a time. Be very careful not to burn yourself. Test the cloth on a non-infected area to make sure it is not too hot.  · Continue to change the dressing daily. If it becomes wet, stained with wound fluid, or dirty, change it sooner. To change it:  ¨ Wash your hands with soap and water before changing the dressing.  ¨ Carefully remove the dressing and tape. If it sticks to the wound, you may need to wet it a little to remove it. (Do not do this if your healthcare provider has told you not to.)  ¨ Gently clean the wound with clean water (or saline) using gauze, a clean washcloth, or cotton swab.  ¨ Do not use soap, alcohol, peroxide or other cleansers.  ¨ If you were told to dry the wound before putting on a new dressing, gently pat. Do not rub.  ¨ Throw out the old dressing.  ¨ Wash your hands again before opening the new, clean dressing.  ¨ Wash your hands again when you are done.  Follow-up care  Follow up with your healthcare provider as advised. If a culture was done, you will be notified if your treatment needs to change. Call as directed for the results.  When to seek medical advice  Call your health care provider right away if any of these occur:  · Symptoms of infection don't start to improve within 2 days of starting antibiotics  · Symptoms of infection get worse  · New symptoms, such as red streaks around the wound  · Fever of 100.4°F (38.0°C) or higher for more than  2 days after starting the antibiotics  Date Last Reviewed: 8/10/2015  © 2085-4543 [employer]. [address] [place] [address], [place] [address]. All rights reserved. This information is not intended as a substitute for professional medical care. Always follow your healthcare professional's instructions.

## 2021-05-04 ENCOUNTER — NON-APPOINTMENT (OUTPATIENT)
Age: 31
End: 2021-05-04

## 2021-05-19 ENCOUNTER — NON-APPOINTMENT (OUTPATIENT)
Age: 31
End: 2021-05-19

## 2021-05-20 ENCOUNTER — NON-APPOINTMENT (OUTPATIENT)
Age: 31
End: 2021-05-20

## 2021-05-28 ENCOUNTER — APPOINTMENT (OUTPATIENT)
Dept: NEPHROLOGY | Facility: CLINIC | Age: 31
End: 2021-05-28
Payer: COMMERCIAL

## 2021-05-28 PROCEDURE — 99204 OFFICE O/P NEW MOD 45 MIN: CPT

## 2021-05-28 PROCEDURE — 99072 ADDL SUPL MATRL&STAF TM PHE: CPT

## 2021-05-28 NOTE — ASSESSMENT
[FreeTextEntry1] : # Nephrolithiasis with hypocitraturia and low urine volume.\par * Increase fluid intake to 3 liter daily. Waterminder sandrita prescribed.\par * She is on a relatively low oxalate diet.\par * Start potassium citrate 10 meq bid. (Moonstone drink and crystal light also discussed.) \par * Repeat litholink in 3 months.\par * Follow up in 4 months.

## 2021-05-28 NOTE — HISTORY OF PRESENT ILLNESS
[FreeTextEntry1] : Kindly referred by Dr. Polanco for nephrolithiasis. She is a . \par \par * First kidney stone 4 years ago, right sided, passed spontaneously. In February 2021 she had 2 right sided 3 mm stones that required nephrolithotomy by Dr. Polanco. Stone was 100% calcium oxalate. \par \par * Litholink showed low urine volume (0.8), high calcium/oxalate, low citrate (400s). Ultrasound 3/2 showed no stones. She has been following low oxalate urine test prior to test.

## 2021-05-28 NOTE — PHYSICAL EXAM
[General Appearance - Alert] : alert [General Appearance - In No Acute Distress] : in no acute distress [Neck Appearance] : the appearance of the neck was normal [Neck Cervical Mass (___cm)] : no neck mass was observed [Jugular Venous Distention Increased] : there was no jugular-venous distention [Thyroid Diffuse Enlargement] : the thyroid was not enlarged [Thyroid Nodule] : there were no palpable thyroid nodules [Auscultation Breath Sounds / Voice Sounds] : lungs were clear to auscultation bilaterally [Heart Rate And Rhythm] : heart rate was normal and rhythm regular [Heart Sounds Gallop] : no gallops [Heart Sounds] : normal S1 and S2 [Murmurs] : no murmurs [Heart Sounds Pericardial Friction Rub] : no pericardial rub [Edema] : there was no peripheral edema [Bowel Sounds] : normal bowel sounds [Abdomen Soft] : soft [Abdomen Tenderness] : non-tender [Abdomen Mass (___ Cm)] : no abdominal mass palpated [] : no hepato-splenomegaly [Cervical Lymph Nodes Enlarged Posterior Bilaterally] : posterior cervical [Cervical Lymph Nodes Enlarged Anterior Bilaterally] : anterior cervical [Supraclavicular Lymph Nodes Enlarged Bilaterally] : supraclavicular

## 2021-06-25 ENCOUNTER — APPOINTMENT (OUTPATIENT)
Dept: UROLOGY | Facility: CLINIC | Age: 31
End: 2021-06-25
Payer: COMMERCIAL

## 2021-06-25 VITALS — DIASTOLIC BLOOD PRESSURE: 73 MMHG | SYSTOLIC BLOOD PRESSURE: 107 MMHG | HEART RATE: 72 BPM | TEMPERATURE: 98.1 F

## 2021-06-25 PROCEDURE — 99212 OFFICE O/P EST SF 10 MIN: CPT

## 2021-06-25 PROCEDURE — 99072 ADDL SUPL MATRL&STAF TM PHE: CPT

## 2021-06-25 NOTE — HISTORY OF PRESENT ILLNESS
[FreeTextEntry1] : hx caox stone\par now following with renal\par on Urocitk\par feels well\par mild lightheadedness since starting\par no pain\par due to f/u with renal\par f/u here annually

## 2021-08-04 ENCOUNTER — RX RENEWAL (OUTPATIENT)
Age: 31
End: 2021-08-04

## 2021-09-07 ENCOUNTER — RX RENEWAL (OUTPATIENT)
Age: 31
End: 2021-09-07

## 2021-12-01 ENCOUNTER — APPOINTMENT (OUTPATIENT)
Dept: NEPHROLOGY | Facility: CLINIC | Age: 31
End: 2021-12-01
Payer: COMMERCIAL

## 2021-12-01 PROCEDURE — 99214 OFFICE O/P EST MOD 30 MIN: CPT

## 2021-12-01 RX ORDER — POTASSIUM CHLORIDE 750 MG/1
10 CAPSULE, EXTENDED RELEASE ORAL TWICE DAILY
Qty: 180 | Refills: 3 | Status: DISCONTINUED | COMMUNITY
Start: 2021-05-28 | End: 2021-12-01

## 2021-12-01 NOTE — ASSESSMENT
[FreeTextEntry1] : # Nephrolithiasis with hypocitraturia and low urine volume.\par * 24 hour urine may have been overcollection. \par * Increase fluid intake to 3 liter daily. Waterminder sandrita prescribed.\par * She is on a relatively low oxalate diet.\par * Start potassium citrate 10 meq bid. (Moonstone drink and crystal light also discussed.) \par * Reduce animal protein in diet. \par * Repeat litholink in 3 months.\par * Follow up in 4 months. \par \par # Muscular back discomfort.\par * Target renal ultrasound on right showed no hydronephrosis.\par * Counseling on muscular discomfort from [employer] given.

## 2021-12-01 NOTE — HISTORY OF PRESENT ILLNESS
[FreeTextEntry1] : Kindly referred by Dr. Polanco for nephrolithiasis. She is a . \par \par * 24 hour urine showed higher creatinine (? overcollection), low volume, high calcium (? error), higher citrate (? error), high UA. * Intermittent .5/10 right lower lumbosacral area discomfort. She exercises daily. Poor desk posture. \par \par Previous history ([id number]): * First kidney stone 4 years ago, right sided, passed spontaneously. In February 2021 she had 2 right sided 3 mm stones that required nephrolithotomy by Dr. Polanco. Stone was 100% calcium oxalate. \par \par * Litholink showed low urine volume (0.8), high calcium/oxalate, low citrate (400s). Ultrasound 3/2 showed no stones. She has been following low oxalate urine test prior to test.

## 2021-12-28 ENCOUNTER — APPOINTMENT (OUTPATIENT)
Dept: DERMATOLOGY | Facility: CLINIC | Age: 31
End: 2021-12-28
Payer: COMMERCIAL

## 2021-12-28 PROCEDURE — 99203 OFFICE O/P NEW LOW 30 MIN: CPT | Mod: 25

## 2021-12-28 PROCEDURE — 17110 DESTRUCTION B9 LES UP TO 14: CPT

## 2022-01-03 ENCOUNTER — NON-APPOINTMENT (OUTPATIENT)
Age: 32
End: 2022-01-03

## 2022-01-25 ENCOUNTER — APPOINTMENT (OUTPATIENT)
Dept: DERMATOLOGY | Facility: CLINIC | Age: 32
End: 2022-01-25

## 2022-03-30 ENCOUNTER — APPOINTMENT (OUTPATIENT)
Dept: INTERNAL MEDICINE | Facility: CLINIC | Age: 32
End: 2022-03-30
Payer: COMMERCIAL

## 2022-03-30 VITALS
DIASTOLIC BLOOD PRESSURE: 71 MMHG | BODY MASS INDEX: 17.73 KG/M2 | WEIGHT: 117 LBS | SYSTOLIC BLOOD PRESSURE: 103 MMHG | HEART RATE: 78 BPM | HEIGHT: 68 IN | TEMPERATURE: 97.5 F | OXYGEN SATURATION: 100 %

## 2022-03-30 DIAGNOSIS — B00.9 HERPESVIRAL INFECTION, UNSPECIFIED: ICD-10-CM

## 2022-03-30 PROCEDURE — 99214 OFFICE O/P EST MOD 30 MIN: CPT

## 2023-03-14 ENCOUNTER — APPOINTMENT (OUTPATIENT)
Dept: INTERNAL MEDICINE | Facility: CLINIC | Age: 33
End: 2023-03-14
Payer: COMMERCIAL

## 2023-03-14 DIAGNOSIS — Z87.442 PERSONAL HISTORY OF URINARY CALCULI: ICD-10-CM

## 2023-03-14 DIAGNOSIS — L98.8 OTHER SPECIFIED DISORDERS OF THE SKIN AND SUBCUTANEOUS TISSUE: ICD-10-CM

## 2023-03-14 DIAGNOSIS — N20.1 CALCULUS OF URETER: ICD-10-CM

## 2023-03-14 DIAGNOSIS — Z86.19 PERSONAL HISTORY OF OTHER INFECTIOUS AND PARASITIC DISEASES: ICD-10-CM

## 2023-03-14 DIAGNOSIS — F51.01 PRIMARY INSOMNIA: ICD-10-CM

## 2023-03-14 DIAGNOSIS — Z87.39 PERSONAL HISTORY OF OTHER DISEASES OF THE MUSCULOSKELETAL SYSTEM AND CONNECTIVE TISSUE: ICD-10-CM

## 2023-03-14 DIAGNOSIS — Z86.018 PERSONAL HISTORY OF OTHER BENIGN NEOPLASM: ICD-10-CM

## 2023-03-14 DIAGNOSIS — R34 ANURIA AND OLIGURIA: ICD-10-CM

## 2023-03-14 PROCEDURE — 99213 OFFICE O/P EST LOW 20 MIN: CPT | Mod: 95

## 2023-03-14 RX ORDER — METFORMIN HYDROCHLORIDE 500 MG/1
500 TABLET, COATED ORAL
Refills: 0 | Status: ACTIVE | COMMUNITY

## 2023-03-14 RX ORDER — POTASSIUM CITRATE 10 MEQ/1
10 MEQ TABLET, EXTENDED RELEASE ORAL
Qty: 180 | Refills: 3 | Status: COMPLETED | COMMUNITY
Start: 2021-12-01 | End: 2023-03-14

## 2023-03-14 RX ORDER — SODIUM SULFATE, POTASSIUM SULFATE, MAGNESIUM SULFATE 17.5; 3.13; 1.6 G/ML; G/ML; G/ML
17.5-3.13-1.6 SOLUTION, CONCENTRATE ORAL
Qty: 354 | Refills: 0 | Status: COMPLETED | COMMUNITY
Start: 2020-08-28 | End: 2023-03-14

## 2023-03-14 NOTE — PHYSICAL EXAM
[No Acute Distress] : no acute distress [Normal Sclera/Conjunctiva] : normal sclera/conjunctiva [Normal] : affect was normal and insight and judgment were intact

## 2023-03-14 NOTE — HISTORY OF PRESENT ILLNESS
[Home] : at home, [unfilled] , at the time of the visit. [Medical Office: ([facility])___] : at the medical office located in  [Verbal consent obtained from patient] : the patient, [unfilled] [de-identified] : 31 y/o female, patient of Dr Bandas, presents for f/u.\par Last seen 3/22.\par Has Adult hypothyroidism- on synthroid 50 mcg qd and 2 days a week 100 mcg qd\par  Sees endocrinologist Dr Rosanna Austin at [facility] for that and for PCOS.\par  \par Suffers from Primary insomnia for which she takes trazadone 100 mg qhs and needs refills. Finds it very effective.\par \par Had recent "stomach bug" but otherwise no other health concerns.

## 2023-03-14 NOTE — HEALTH RISK ASSESSMENT
[0] : 2) Feeling down, depressed, or hopeless: Not at all (0) [PHQ-2 Negative - No further assessment needed] : PHQ-2 Negative - No further assessment needed [Never] : Never [[id number]] : 0

## 2023-11-13 ENCOUNTER — RX RENEWAL (OUTPATIENT)
Age: 33
End: 2023-11-13

## 2024-01-31 ENCOUNTER — APPOINTMENT (OUTPATIENT)
Age: 34
End: 2024-01-31
Payer: COMMERCIAL

## 2024-01-31 VITALS — OXYGEN SATURATION: 99 % | SYSTOLIC BLOOD PRESSURE: 111 MMHG | DIASTOLIC BLOOD PRESSURE: 78 MMHG | HEART RATE: 59 BPM

## 2024-01-31 PROCEDURE — 99204 OFFICE O/P NEW MOD 45 MIN: CPT

## 2024-01-31 PROCEDURE — 76830 TRANSVAGINAL US NON-OB: CPT

## 2024-01-31 NOTE — PHYSICAL EXAM
[Chaperone Present] : A chaperone was present in the examining room during all aspects of the physical examination [Appropriately responsive] : appropriately responsive [Normal] : normal external genitalia [FreeTextEntry4] : old blood in vaginal vault  [FreeTextEntry5] : no active bleeding on valsalva, no lesions

## 2024-01-31 NOTE — PROCEDURE
[Abnormal Uterine Bleeding] : abnormal uterine bleeding [Transvaginal Ultrasound] : transvaginal ultrasound [Anteverted] : anteverted [FreeTextEntry5] : Endometrial thickness: 3mm, no vascularity or color flow  [FreeTextEntry4] : no FF

## 2024-01-31 NOTE — PLAN
[FreeTextEntry1] : -Patient is going to monitor bleeding for now, and consider provera by her primary OB -Patient reassured   RTO PRN

## 2024-01-31 NOTE — HISTORY OF PRESENT ILLNESS
[Patient reported PAP Smear was normal] : Patient reported PAP Smear was normal [FreeTextEntry1] : 32yo  LMP: unknown here for second opinion. Patient states she was at her primary OB/GYN yesterday to follow up for 1 month of AUB with heavy bleeding soaking through multiple tampons in a day (1 tampon q4 hours) which is abnormal for her regular light menses. States she has been bleeding since beginning of January, and was concerned 2/2 usually having regular menses. Yesterday, she had negative upreg, but then 1 hour later turned positive, and she was called to return to OB office for blood work and showed beta hcg was negative. States he performed TVUS, but did not know the results and he did not express concerns. Patient states she took Plan B earlier in the month and does not use anything usually for contraception, and states there was 2-3 days of heavier bleeding mid January with bloating, 1 episode of night sweats, swollen hands. Now with all of this information she is concerned that she might have had a miscarriage and is still bleeding. Never had previous positive pregnancy, or sonogram to confirm pregnancy. Patient was offered provera from her primary OB, but is unsure she wants to take it at this time. Patient counseled that it would be difficut to assess without a confirmation of IUP that this is a miscarriage, and I can redo the TVUS to assess endometrial thickness and color/vascular flow. Denies abdominal cramping, vaginal odor or discolored discharge, dysuria.   OB: [id number] (surgical) GYN: LMP: early january, pap smear 08/2023 NILM, states she had abnormal pap smear with LEEP >10years ago, sexually active with 1 male partner no contraception, h/o HSV1 PMHx: hypothyroidism, kidney stones, insomnia Sx: colonoscopy, lithrotripsy, LEEP All: penicllins - unsure  Meds: trazadone, linzess, synthroid [PapSmeardate] : 8/23

## 2024-04-30 PROBLEM — R73.09 ELEVATED GLUCOSE: Status: ACTIVE | Noted: 2024-04-30

## 2024-04-30 PROBLEM — E78.00 ELEVATED CHOLESTEROL: Status: ACTIVE | Noted: 2024-04-30

## 2024-05-01 ENCOUNTER — APPOINTMENT (OUTPATIENT)
Dept: INTERNAL MEDICINE | Facility: CLINIC | Age: 34
End: 2024-05-01
Payer: COMMERCIAL

## 2024-05-01 VITALS
OXYGEN SATURATION: 99 % | HEART RATE: 60 BPM | TEMPERATURE: 97.5 F | BODY MASS INDEX: 17.8 KG/M2 | DIASTOLIC BLOOD PRESSURE: 72 MMHG | WEIGHT: 117.06 LBS | SYSTOLIC BLOOD PRESSURE: 115 MMHG

## 2024-05-01 DIAGNOSIS — F41.9 ANXIETY DISORDER, UNSPECIFIED: ICD-10-CM

## 2024-05-01 DIAGNOSIS — E28.2 POLYCYSTIC OVARIAN SYNDROME: ICD-10-CM

## 2024-05-01 DIAGNOSIS — E03.9 HYPOTHYROIDISM, UNSPECIFIED: ICD-10-CM

## 2024-05-01 DIAGNOSIS — Z00.00 ENCOUNTER FOR GENERAL ADULT MEDICAL EXAMINATION W/OUT ABNORMAL FINDINGS: ICD-10-CM

## 2024-05-01 DIAGNOSIS — E78.00 PURE HYPERCHOLESTEROLEMIA, UNSPECIFIED: ICD-10-CM

## 2024-05-01 DIAGNOSIS — R73.09 OTHER ABNORMAL GLUCOSE: ICD-10-CM

## 2024-05-01 PROCEDURE — 99395 PREV VISIT EST AGE 18-39: CPT

## 2024-05-01 PROCEDURE — 36415 COLL VENOUS BLD VENIPUNCTURE: CPT

## 2024-05-01 RX ORDER — TRAZODONE HYDROCHLORIDE 100 MG/1
100 TABLET ORAL
Qty: 30 | Refills: 5 | Status: ACTIVE | COMMUNITY
Start: 2018-12-06 | End: 1900-01-01

## 2024-05-01 RX ORDER — VALACYCLOVIR 1 G/1
1 TABLET, FILM COATED ORAL
Qty: 24 | Refills: 3 | Status: ACTIVE | COMMUNITY
Start: 2021-12-28 | End: 1900-01-01

## 2024-05-02 LAB
ALBUMIN SERPL ELPH-MCNC: 4.8 G/DL
ALP BLD-CCNC: 75 U/L
ALT SERPL-CCNC: 12 U/L
ANION GAP SERPL CALC-SCNC: 12 MMOL/L
AST SERPL-CCNC: 20 U/L
BILIRUB SERPL-MCNC: 0.4 MG/DL
BUN SERPL-MCNC: 15 MG/DL
CALCIUM SERPL-MCNC: 9.8 MG/DL
CHLORIDE SERPL-SCNC: 106 MMOL/L
CHOLEST SERPL-MCNC: 185 MG/DL
CO2 SERPL-SCNC: 22 MMOL/L
CREAT SERPL-MCNC: 0.9 MG/DL
EGFR: 86 ML/MIN/1.73M2
ESTIMATED AVERAGE GLUCOSE: 100 MG/DL
GLUCOSE SERPL-MCNC: 79 MG/DL
HBA1C MFR BLD HPLC: 5.1 %
HCT VFR BLD CALC: 36.5 %
HDLC SERPL-MCNC: 86 MG/DL
HGB BLD-MCNC: 12.3 G/DL
LDLC SERPL CALC-MCNC: 88 MG/DL
MCHC RBC-ENTMCNC: 30.1 PG
MCHC RBC-ENTMCNC: 33.7 GM/DL
MCV RBC AUTO: 89.5 FL
NONHDLC SERPL-MCNC: 99 MG/DL
PLATELET # BLD AUTO: 289 K/UL
POTASSIUM SERPL-SCNC: 4.7 MMOL/L
PROT SERPL-MCNC: 7.8 G/DL
RBC # BLD: 4.08 M/UL
RBC # FLD: 13.3 %
SODIUM SERPL-SCNC: 141 MMOL/L
TRIGL SERPL-MCNC: 58 MG/DL
TSH SERPL-ACNC: 1.48 UIU/ML
WBC # FLD AUTO: 6.27 K/UL

## 2024-09-04 NOTE — ED PROVIDER NOTE - CONSTITUTIONAL, MLM
Suspect this is contributing to admitting symptoms, possibly complicated by myocarditis  Completed 3 days of remdesivir  Currently improving, on room air   normal... Well appearing, awake, alert, oriented to person, place, time/situation and in no apparent distress.

## 2024-11-30 ENCOUNTER — RX RENEWAL (OUTPATIENT)
Age: 34
End: 2024-11-30

## 2024-12-09 ENCOUNTER — NON-APPOINTMENT (OUTPATIENT)
Age: 34
End: 2024-12-09

## 2024-12-17 ENCOUNTER — APPOINTMENT (OUTPATIENT)
Dept: INTERNAL MEDICINE | Facility: CLINIC | Age: 34
End: 2024-12-17
Payer: COMMERCIAL

## 2024-12-17 VITALS
DIASTOLIC BLOOD PRESSURE: 69 MMHG | HEART RATE: 89 BPM | TEMPERATURE: 98.2 F | OXYGEN SATURATION: 99 % | SYSTOLIC BLOOD PRESSURE: 97 MMHG | HEIGHT: 68 IN

## 2024-12-17 VITALS — WEIGHT: 116 LBS | BODY MASS INDEX: 17.64 KG/M2

## 2024-12-17 DIAGNOSIS — J06.9 ACUTE UPPER RESPIRATORY INFECTION, UNSPECIFIED: ICD-10-CM

## 2024-12-17 DIAGNOSIS — E03.9 HYPOTHYROIDISM, UNSPECIFIED: ICD-10-CM

## 2024-12-17 DIAGNOSIS — B00.9 HERPESVIRAL INFECTION, UNSPECIFIED: ICD-10-CM

## 2024-12-17 PROCEDURE — 99213 OFFICE O/P EST LOW 20 MIN: CPT

## 2025-05-20 ENCOUNTER — LABORATORY RESULT (OUTPATIENT)
Age: 35
End: 2025-05-20

## 2025-05-20 ENCOUNTER — NON-APPOINTMENT (OUTPATIENT)
Age: 35
End: 2025-05-20

## 2025-05-20 ENCOUNTER — APPOINTMENT (OUTPATIENT)
Dept: INTERNAL MEDICINE | Facility: CLINIC | Age: 35
End: 2025-05-20
Payer: COMMERCIAL

## 2025-05-20 VITALS
DIASTOLIC BLOOD PRESSURE: 69 MMHG | OXYGEN SATURATION: 98 % | WEIGHT: 120 LBS | TEMPERATURE: 97.5 F | HEIGHT: 68 IN | SYSTOLIC BLOOD PRESSURE: 108 MMHG | BODY MASS INDEX: 18.19 KG/M2 | HEART RATE: 54 BPM

## 2025-05-20 DIAGNOSIS — E03.9 HYPOTHYROIDISM, UNSPECIFIED: ICD-10-CM

## 2025-05-20 DIAGNOSIS — Z78.9 OTHER SPECIFIED HEALTH STATUS: ICD-10-CM

## 2025-05-20 PROCEDURE — 36415 COLL VENOUS BLD VENIPUNCTURE: CPT

## 2025-05-20 PROCEDURE — 99213 OFFICE O/P EST LOW 20 MIN: CPT

## 2025-05-21 LAB
MEV IGG FLD QL IA: 33.2 AU/ML
MEV IGG+IGM SER-IMP: POSITIVE
MUV AB SER-ACNC: POSITIVE
MUV IGG SER QL IA: 12.9 AU/ML
RUBV IGG FLD-ACNC: 3.44 INDEX
RUBV IGG SER-IMP: POSITIVE
TSH SERPL-ACNC: 0.17 UIU/ML

## 2025-06-23 ENCOUNTER — NON-APPOINTMENT (OUTPATIENT)
Age: 35
End: 2025-06-23

## 2025-07-04 ENCOUNTER — RX RENEWAL (OUTPATIENT)
Age: 35
End: 2025-07-04